# Patient Record
Sex: FEMALE | Race: WHITE | Employment: FULL TIME | URBAN - METROPOLITAN AREA
[De-identification: names, ages, dates, MRNs, and addresses within clinical notes are randomized per-mention and may not be internally consistent; named-entity substitution may affect disease eponyms.]

---

## 2017-01-11 ENCOUNTER — TELEPHONE (OUTPATIENT)
Dept: FAMILY MEDICINE CLINIC | Age: 52
End: 2017-01-11

## 2017-04-06 ENCOUNTER — HOSPITAL ENCOUNTER (OUTPATIENT)
Dept: OTHER | Age: 52
Discharge: HOME | End: 2017-04-06
Attending: OBSTETRICS & GYNECOLOGY | Admitting: OBSTETRICS & GYNECOLOGY

## 2017-04-09 LAB
HPV SOURCE: NORMAL
HPV, HIGH RISK OTHER: NEGATIVE

## 2017-04-12 ENCOUNTER — OFFICE VISIT (OUTPATIENT)
Dept: INTERNAL MEDICINE CLINIC | Age: 52
End: 2017-04-12

## 2017-04-12 VITALS
BODY MASS INDEX: 35.2 KG/M2 | OXYGEN SATURATION: 98 % | HEART RATE: 64 BPM | RESPIRATION RATE: 16 BRPM | DIASTOLIC BLOOD PRESSURE: 78 MMHG | HEIGHT: 66 IN | WEIGHT: 219 LBS | SYSTOLIC BLOOD PRESSURE: 136 MMHG

## 2017-04-12 DIAGNOSIS — Z00.00 PREVENTATIVE HEALTH CARE: ICD-10-CM

## 2017-04-12 DIAGNOSIS — E03.4 HYPOTHYROIDISM DUE TO ACQUIRED ATROPHY OF THYROID: ICD-10-CM

## 2017-04-12 DIAGNOSIS — F33.41 RECURRENT MAJOR DEPRESSIVE DISORDER, IN PARTIAL REMISSION (HCC): ICD-10-CM

## 2017-04-12 DIAGNOSIS — Z12.11 COLON CANCER SCREENING: ICD-10-CM

## 2017-04-12 DIAGNOSIS — I10 ESSENTIAL HYPERTENSION: Primary | ICD-10-CM

## 2017-04-12 DIAGNOSIS — J30.2 SEASONAL ALLERGIC RHINITIS, UNSPECIFIED ALLERGIC RHINITIS TRIGGER: ICD-10-CM

## 2017-04-12 PROCEDURE — 99215 OFFICE O/P EST HI 40 MIN: CPT | Performed by: INTERNAL MEDICINE

## 2017-04-12 RX ORDER — FLUTICASONE PROPIONATE 50 MCG
1 SPRAY, SUSPENSION (ML) NASAL DAILY
COMMUNITY
End: 2021-04-20

## 2017-04-12 RX ORDER — ESCITALOPRAM OXALATE 10 MG/1
10 TABLET ORAL DAILY
Qty: 90 TABLET | Refills: 3 | Status: SHIPPED | OUTPATIENT
Start: 2017-04-12 | End: 2018-01-05 | Stop reason: SDUPTHER

## 2017-04-12 RX ORDER — LORATADINE 10 MG/1
10 TABLET ORAL DAILY
COMMUNITY

## 2017-04-14 ENCOUNTER — HOSPITAL ENCOUNTER (OUTPATIENT)
Dept: OTHER | Age: 52
Discharge: HOME | End: 2017-04-14
Attending: OBSTETRICS & GYNECOLOGY | Admitting: OBSTETRICS & GYNECOLOGY

## 2017-04-14 ENCOUNTER — HOSPITAL ENCOUNTER (OUTPATIENT)
Dept: LAB | Age: 52
Discharge: HOME | End: 2017-04-14
Attending: FAMILY MEDICINE | Admitting: FAMILY MEDICINE

## 2017-04-14 LAB
ALBUMIN SERPL-MCNC: 4.4 GM/DL (ref 3.4–5)
ALP BLD-CCNC: 91 IU/L (ref 40–128)
ALT SERPL-CCNC: 34 U/L (ref 10–40)
ANION GAP SERPL CALCULATED.3IONS-SCNC: 13 MMOL/L (ref 4–16)
AST SERPL-CCNC: 21 IU/L (ref 15–37)
BASOPHILS ABSOLUTE: 0.1 K/CU MM
BASOPHILS RELATIVE PERCENT: 0.6 % (ref 0–1)
BILIRUB SERPL-MCNC: 0.5 MG/DL (ref 0–1)
BUN BLDV-MCNC: 16 MG/DL (ref 6–23)
CALCIUM SERPL-MCNC: 9.5 MG/DL (ref 8.3–10.6)
CHLORIDE BLD-SCNC: 101 MMOL/L (ref 99–110)
CHOLESTEROL: 200 MG/DL
CO2: 26 MMOL/L (ref 21–32)
CREAT SERPL-MCNC: 0.9 MG/DL (ref 0.6–1.1)
DIFFERENTIAL TYPE: ABNORMAL
EOSINOPHILS ABSOLUTE: 0.3 K/CU MM
EOSINOPHILS RELATIVE PERCENT: 4.1 % (ref 0–3)
GFR AFRICAN AMERICAN: >60 ML/MIN/1.73M2
GFR NON-AFRICAN AMERICAN: >60 ML/MIN/1.73M2
GLUCOSE BLD-MCNC: 96 MG/DL (ref 70–140)
HCT VFR BLD CALC: 44.9 % (ref 37–47)
HDLC SERPL-MCNC: 44 MG/DL
HEMOGLOBIN: 14.9 GM/DL (ref 12.5–16)
IMMATURE NEUTROPHIL %: 0.6 % (ref 0–0.43)
LDL CHOLESTEROL CALCULATED: 136 MG/DL
LYMPHOCYTES ABSOLUTE: 2 K/CU MM
LYMPHOCYTES RELATIVE PERCENT: 26.1 % (ref 24–44)
MCH RBC QN AUTO: 32.7 PG (ref 27–31)
MCHC RBC AUTO-ENTMCNC: 33.2 % (ref 32–36)
MCV RBC AUTO: 98.5 FL (ref 78–100)
MONOCYTES ABSOLUTE: 0.5 K/CU MM
MONOCYTES RELATIVE PERCENT: 6.3 % (ref 0–4)
NUCLEATED RBC %: 0 %
PDW BLD-RTO: 12.7 % (ref 11.7–14.9)
PLATELET # BLD: 309 K/CU MM (ref 140–440)
PMV BLD AUTO: 10.5 FL (ref 7.5–11.1)
POTASSIUM SERPL-SCNC: 4.5 MMOL/L (ref 3.5–5.1)
RBC # BLD: 4.56 M/CU MM (ref 4.2–5.4)
SEGMENTED NEUTROPHILS ABSOLUTE COUNT: 4.9 K/CU MM
SEGMENTED NEUTROPHILS RELATIVE PERCENT: 62.3 % (ref 36–66)
SODIUM BLD-SCNC: 140 MMOL/L (ref 135–145)
TOTAL IMMATURE NEUTOROPHIL: 0.05 K/CU MM
TOTAL NUCLEATED RBC: 0 K/CU MM
TOTAL PROTEIN: 6.3 GM/DL (ref 6.4–8.2)
TRIGL SERPL-MCNC: 101 MG/DL
WBC # BLD: 7.8 K/CU MM (ref 4–10.5)

## 2017-04-15 LAB — HIV SCREEN: NON REACTIVE

## 2017-04-17 DIAGNOSIS — E03.4 HYPOTHYROIDISM DUE TO ACQUIRED ATROPHY OF THYROID: ICD-10-CM

## 2017-04-17 RX ORDER — LEVOTHYROXINE SODIUM 175 UG/1
TABLET ORAL
Qty: 90 TABLET | Refills: 1 | Status: SHIPPED | OUTPATIENT
Start: 2017-04-17 | End: 2017-10-06 | Stop reason: SDUPTHER

## 2017-04-28 ENCOUNTER — HOSPITAL ENCOUNTER (OUTPATIENT)
Dept: GENERAL RADIOLOGY | Age: 52
Discharge: HOME | End: 2017-04-28
Attending: OBSTETRICS & GYNECOLOGY | Admitting: OBSTETRICS & GYNECOLOGY

## 2017-04-28 LAB — TSH HIGH SENSITIVITY: 0.43 UIU/ML (ref 0.27–4.2)

## 2017-05-22 ENCOUNTER — HOSPITAL ENCOUNTER (OUTPATIENT)
Dept: OTHER | Age: 52
Discharge: HOME | End: 2017-05-22
Attending: OBSTETRICS & GYNECOLOGY | Admitting: OBSTETRICS & GYNECOLOGY

## 2017-05-24 ENCOUNTER — TELEPHONE (OUTPATIENT)
Dept: FAMILY MEDICINE CLINIC | Age: 52
End: 2017-05-24

## 2017-05-24 ENCOUNTER — OFFICE VISIT (OUTPATIENT)
Dept: INTERNAL MEDICINE CLINIC | Age: 52
End: 2017-05-24

## 2017-05-24 VITALS
DIASTOLIC BLOOD PRESSURE: 78 MMHG | BODY MASS INDEX: 35.35 KG/M2 | RESPIRATION RATE: 16 BRPM | WEIGHT: 219 LBS | HEART RATE: 84 BPM | SYSTOLIC BLOOD PRESSURE: 124 MMHG

## 2017-05-24 DIAGNOSIS — F33.41 RECURRENT MAJOR DEPRESSIVE DISORDER, IN PARTIAL REMISSION (HCC): ICD-10-CM

## 2017-05-24 DIAGNOSIS — G47.10 HYPERSOMNIA: Primary | ICD-10-CM

## 2017-05-24 PROCEDURE — 99213 OFFICE O/P EST LOW 20 MIN: CPT | Performed by: INTERNAL MEDICINE

## 2017-05-24 ASSESSMENT — PATIENT HEALTH QUESTIONNAIRE - PHQ9
SUM OF ALL RESPONSES TO PHQ9 QUESTIONS 1 & 2: 0
1. LITTLE INTEREST OR PLEASURE IN DOING THINGS: 0
2. FEELING DOWN, DEPRESSED OR HOPELESS: 0
SUM OF ALL RESPONSES TO PHQ QUESTIONS 1-9: 0

## 2017-05-25 ENCOUNTER — HOSPITAL ENCOUNTER (OUTPATIENT)
Dept: WOMENS IMAGING | Age: 52
Discharge: HOME | End: 2017-05-25
Attending: OBSTETRICS & GYNECOLOGY | Admitting: OBSTETRICS & GYNECOLOGY

## 2017-05-25 DIAGNOSIS — Z12.31 VISIT FOR SCREENING MAMMOGRAM: ICD-10-CM

## 2017-05-31 ENCOUNTER — HOSPITAL ENCOUNTER (OUTPATIENT)
Dept: ULTRASOUND IMAGING | Age: 52
Discharge: HOME | End: 2017-05-31
Attending: OBSTETRICS & GYNECOLOGY | Admitting: OBSTETRICS & GYNECOLOGY

## 2017-05-31 DIAGNOSIS — R92.8 ABNORMAL MAMMOGRAM: ICD-10-CM

## 2017-06-13 RX ORDER — SODIUM CHLORIDE, SODIUM LACTATE, POTASSIUM CHLORIDE, CALCIUM CHLORIDE 600; 310; 30; 20 MG/100ML; MG/100ML; MG/100ML; MG/100ML
INJECTION, SOLUTION INTRAVENOUS CONTINUOUS
Status: DISCONTINUED | OUTPATIENT
Start: 2017-06-13 | End: 2017-06-15 | Stop reason: HOSPADM

## 2017-06-14 ENCOUNTER — HOSPITAL ENCOUNTER (OUTPATIENT)
Dept: SURGERY | Age: 52
Discharge: HOME | End: 2017-06-14
Attending: SPECIALIST | Admitting: SPECIALIST

## 2017-06-14 VITALS
TEMPERATURE: 97.8 F | DIASTOLIC BLOOD PRESSURE: 83 MMHG | OXYGEN SATURATION: 100 % | HEIGHT: 66 IN | HEART RATE: 56 BPM | RESPIRATION RATE: 16 BRPM | SYSTOLIC BLOOD PRESSURE: 127 MMHG | BODY MASS INDEX: 34.87 KG/M2 | WEIGHT: 217 LBS

## 2017-06-14 LAB — PREGNANCY TEST URINE, POC: NEGATIVE

## 2017-06-14 RX ADMIN — SODIUM CHLORIDE, SODIUM LACTATE, POTASSIUM CHLORIDE, CALCIUM CHLORIDE: 600; 310; 30; 20 INJECTION, SOLUTION INTRAVENOUS at 06:35

## 2017-06-14 ASSESSMENT — PAIN DESCRIPTION - DESCRIPTORS: DESCRIPTORS: CRAMPING

## 2017-06-14 ASSESSMENT — PAIN SCALES - GENERAL
PAINLEVEL_OUTOF10: 0
PAINLEVEL_OUTOF10: 1

## 2017-06-14 ASSESSMENT — PAIN DESCRIPTION - LOCATION: LOCATION: ABDOMEN

## 2017-06-14 ASSESSMENT — PAIN DESCRIPTION - PAIN TYPE: TYPE: ACUTE PAIN

## 2017-06-14 ASSESSMENT — PAIN - FUNCTIONAL ASSESSMENT: PAIN_FUNCTIONAL_ASSESSMENT: 0-10

## 2017-06-21 ENCOUNTER — HOSPITAL ENCOUNTER (OUTPATIENT)
Dept: SLEEP CENTER | Age: 52
Discharge: HOME | End: 2017-06-21
Attending: INTERNAL MEDICINE | Admitting: INTERNAL MEDICINE

## 2017-07-05 ENCOUNTER — HOSPITAL ENCOUNTER (OUTPATIENT)
Dept: SLEEP CENTER | Age: 52
Discharge: HOME | End: 2017-07-05
Attending: PSYCHIATRY & NEUROLOGY | Admitting: PSYCHIATRY & NEUROLOGY

## 2017-07-31 ENCOUNTER — OFFICE VISIT (OUTPATIENT)
Dept: INTERNAL MEDICINE CLINIC | Age: 52
End: 2017-07-31

## 2017-07-31 VITALS
RESPIRATION RATE: 16 BRPM | HEART RATE: 84 BPM | SYSTOLIC BLOOD PRESSURE: 122 MMHG | BODY MASS INDEX: 34.54 KG/M2 | DIASTOLIC BLOOD PRESSURE: 76 MMHG | WEIGHT: 214 LBS

## 2017-07-31 DIAGNOSIS — F33.41 RECURRENT MAJOR DEPRESSIVE DISORDER, IN PARTIAL REMISSION (HCC): Primary | ICD-10-CM

## 2017-07-31 PROCEDURE — 99213 OFFICE O/P EST LOW 20 MIN: CPT | Performed by: INTERNAL MEDICINE

## 2017-10-06 DIAGNOSIS — E03.4 HYPOTHYROIDISM DUE TO ACQUIRED ATROPHY OF THYROID: ICD-10-CM

## 2017-10-06 RX ORDER — LISINOPRIL 20 MG/1
TABLET ORAL
Qty: 90 TABLET | Refills: 3 | Status: SHIPPED | OUTPATIENT
Start: 2017-10-06 | End: 2018-01-05 | Stop reason: SDUPTHER

## 2017-10-06 RX ORDER — LEVOTHYROXINE SODIUM 175 MCG
TABLET ORAL
Qty: 90 TABLET | Refills: 0 | Status: SHIPPED | OUTPATIENT
Start: 2017-10-06 | End: 2018-01-05 | Stop reason: SDUPTHER

## 2017-12-05 ENCOUNTER — HOSPITAL ENCOUNTER (OUTPATIENT)
Dept: ULTRASOUND IMAGING | Age: 52
Discharge: HOME | End: 2017-12-05
Attending: OBSTETRICS & GYNECOLOGY | Admitting: OBSTETRICS & GYNECOLOGY

## 2017-12-05 DIAGNOSIS — R92.8 FOLLOW-UP EXAMINATION OF ABNORMAL MAMMOGRAM: ICD-10-CM

## 2017-12-05 DIAGNOSIS — Z09 FOLLOW-UP EXAM: ICD-10-CM

## 2018-01-05 DIAGNOSIS — E03.4 HYPOTHYROIDISM DUE TO ACQUIRED ATROPHY OF THYROID: ICD-10-CM

## 2018-01-05 DIAGNOSIS — F33.41 RECURRENT MAJOR DEPRESSIVE DISORDER, IN PARTIAL REMISSION (HCC): ICD-10-CM

## 2018-01-05 RX ORDER — ESCITALOPRAM OXALATE 10 MG/1
10 TABLET ORAL DAILY
Qty: 90 TABLET | Refills: 0 | Status: SHIPPED | OUTPATIENT
Start: 2018-01-05 | End: 2018-04-16 | Stop reason: SDUPTHER

## 2018-01-05 RX ORDER — LEVOTHYROXINE SODIUM 175 UG/1
TABLET ORAL
Qty: 90 TABLET | Refills: 0 | Status: SHIPPED | OUTPATIENT
Start: 2018-01-05 | End: 2018-04-16 | Stop reason: SDUPTHER

## 2018-01-05 RX ORDER — LISINOPRIL 20 MG/1
TABLET ORAL
Qty: 90 TABLET | Refills: 0 | Status: SHIPPED | OUTPATIENT
Start: 2018-01-05 | End: 2018-04-16 | Stop reason: SDUPTHER

## 2018-02-13 ENCOUNTER — HOSPITAL ENCOUNTER (OUTPATIENT)
Dept: OTHER | Age: 53
Discharge: HOME | End: 2018-02-13
Attending: OBSTETRICS & GYNECOLOGY | Admitting: OBSTETRICS & GYNECOLOGY

## 2018-04-16 ENCOUNTER — OFFICE VISIT (OUTPATIENT)
Dept: INTERNAL MEDICINE CLINIC | Age: 53
End: 2018-04-16

## 2018-04-16 VITALS
RESPIRATION RATE: 18 BRPM | WEIGHT: 211.4 LBS | OXYGEN SATURATION: 98 % | HEART RATE: 70 BPM | DIASTOLIC BLOOD PRESSURE: 80 MMHG | BODY MASS INDEX: 34.12 KG/M2 | SYSTOLIC BLOOD PRESSURE: 122 MMHG

## 2018-04-16 DIAGNOSIS — E03.4 HYPOTHYROIDISM DUE TO ACQUIRED ATROPHY OF THYROID: ICD-10-CM

## 2018-04-16 DIAGNOSIS — I10 ESSENTIAL HYPERTENSION: ICD-10-CM

## 2018-04-16 DIAGNOSIS — Z00.00 PREVENTATIVE HEALTH CARE: Primary | ICD-10-CM

## 2018-04-16 DIAGNOSIS — F33.41 RECURRENT MAJOR DEPRESSIVE DISORDER, IN PARTIAL REMISSION (HCC): ICD-10-CM

## 2018-04-16 PROCEDURE — 99396 PREV VISIT EST AGE 40-64: CPT | Performed by: INTERNAL MEDICINE

## 2018-04-16 RX ORDER — LISINOPRIL 20 MG/1
TABLET ORAL
Qty: 90 TABLET | Refills: 3 | Status: SHIPPED | OUTPATIENT
Start: 2018-04-16 | End: 2019-02-12 | Stop reason: SDUPTHER

## 2018-04-16 RX ORDER — LEVOTHYROXINE SODIUM 175 UG/1
TABLET ORAL
Qty: 90 TABLET | Refills: 3 | Status: SHIPPED | OUTPATIENT
Start: 2018-04-16 | End: 2019-02-12 | Stop reason: SDUPTHER

## 2018-04-16 RX ORDER — ESCITALOPRAM OXALATE 10 MG/1
10 TABLET ORAL DAILY
Qty: 30 TABLET | Refills: 0 | Status: SHIPPED | OUTPATIENT
Start: 2018-04-16 | End: 2019-02-12 | Stop reason: SDUPTHER

## 2018-04-16 RX ORDER — ESCITALOPRAM OXALATE 10 MG/1
10 TABLET ORAL DAILY
Qty: 90 TABLET | Refills: 3 | Status: SHIPPED | OUTPATIENT
Start: 2018-04-16 | End: 2018-04-16 | Stop reason: SDUPTHER

## 2018-05-29 ENCOUNTER — OFFICE VISIT (OUTPATIENT)
Dept: INTERNAL MEDICINE CLINIC | Age: 53
End: 2018-05-29

## 2018-05-29 VITALS
DIASTOLIC BLOOD PRESSURE: 76 MMHG | WEIGHT: 209 LBS | SYSTOLIC BLOOD PRESSURE: 132 MMHG | OXYGEN SATURATION: 98 % | BODY MASS INDEX: 33.73 KG/M2 | RESPIRATION RATE: 16 BRPM | HEART RATE: 62 BPM

## 2018-05-29 DIAGNOSIS — R53.83 FATIGUE, UNSPECIFIED TYPE: Primary | ICD-10-CM

## 2018-05-29 PROCEDURE — 99213 OFFICE O/P EST LOW 20 MIN: CPT | Performed by: INTERNAL MEDICINE

## 2018-05-29 PROCEDURE — G0444 DEPRESSION SCREEN ANNUAL: HCPCS | Performed by: INTERNAL MEDICINE

## 2018-05-29 ASSESSMENT — PATIENT HEALTH QUESTIONNAIRE - PHQ9
SUM OF ALL RESPONSES TO PHQ9 QUESTIONS 1 & 2: 5
9. THOUGHTS THAT YOU WOULD BE BETTER OFF DEAD, OR OF HURTING YOURSELF: 0
4. FEELING TIRED OR HAVING LITTLE ENERGY: 3
7. TROUBLE CONCENTRATING ON THINGS, SUCH AS READING THE NEWSPAPER OR WATCHING TELEVISION: 3
1. LITTLE INTEREST OR PLEASURE IN DOING THINGS: 3
10. IF YOU CHECKED OFF ANY PROBLEMS, HOW DIFFICULT HAVE THESE PROBLEMS MADE IT FOR YOU TO DO YOUR WORK, TAKE CARE OF THINGS AT HOME, OR GET ALONG WITH OTHER PEOPLE: 2
3. TROUBLE FALLING OR STAYING ASLEEP: 3
SUM OF ALL RESPONSES TO PHQ QUESTIONS 1-9: 16
2. FEELING DOWN, DEPRESSED OR HOPELESS: 2
6. FEELING BAD ABOUT YOURSELF - OR THAT YOU ARE A FAILURE OR HAVE LET YOURSELF OR YOUR FAMILY DOWN: 0
8. MOVING OR SPEAKING SO SLOWLY THAT OTHER PEOPLE COULD HAVE NOTICED. OR THE OPPOSITE, BEING SO FIGETY OR RESTLESS THAT YOU HAVE BEEN MOVING AROUND A LOT MORE THAN USUAL: 2
5. POOR APPETITE OR OVEREATING: 0

## 2018-09-28 ENCOUNTER — OFFICE VISIT (OUTPATIENT)
Dept: INTERNAL MEDICINE CLINIC | Age: 53
End: 2018-09-28
Payer: COMMERCIAL

## 2018-09-28 VITALS
BODY MASS INDEX: 34.44 KG/M2 | WEIGHT: 213.4 LBS | SYSTOLIC BLOOD PRESSURE: 132 MMHG | DIASTOLIC BLOOD PRESSURE: 84 MMHG | HEART RATE: 57 BPM | OXYGEN SATURATION: 98 %

## 2018-09-28 DIAGNOSIS — R42 VERTIGO: ICD-10-CM

## 2018-09-28 DIAGNOSIS — R30.0 DYSURIA: Primary | ICD-10-CM

## 2018-09-28 LAB
BILIRUBIN URINE: NEGATIVE
BILIRUBIN, POC: NORMAL
BLOOD URINE, POC: NORMAL
BLOOD, URINE: NEGATIVE
CLARITY, POC: CLEAR
CLARITY: CLEAR
COLOR, POC: NORMAL
COLOR: YELLOW
GLUCOSE URINE, POC: NORMAL
GLUCOSE URINE: NEGATIVE MG/DL
KETONES, POC: NORMAL
KETONES, URINE: NEGATIVE MG/DL
LEUKOCYTE EST, POC: NORMAL
LEUKOCYTE ESTERASE, URINE: NEGATIVE
MICROSCOPIC EXAMINATION: NORMAL
NITRITE, POC: NORMAL
NITRITE, URINE: NEGATIVE
PH UA: 6.5
PH, POC: 6
PROTEIN UA: NEGATIVE MG/DL
PROTEIN, POC: NORMAL
SPECIFIC GRAVITY UA: 1.01
SPECIFIC GRAVITY, POC: <=1.005
URINE TYPE: NORMAL
UROBILINOGEN, POC: 0.2
UROBILINOGEN, URINE: 0.2 E.U./DL

## 2018-09-28 PROCEDURE — 99213 OFFICE O/P EST LOW 20 MIN: CPT | Performed by: INTERNAL MEDICINE

## 2018-09-28 PROCEDURE — 81002 URINALYSIS NONAUTO W/O SCOPE: CPT | Performed by: INTERNAL MEDICINE

## 2018-09-28 NOTE — PROGRESS NOTES
[facility]  1965  09/28/18    SUBJECTIVE:    Last Wednesday pt developed vertigo. She rolled over in bed and immediately had vertigo. Each episode would last 15-30 seconds, accompanied by nausea without vomiting. She denies any numbness, weakness. Since then she has had dizziness with movement. She did have a headache yesterday. She has mild tingling with urination, denies hematuria, urgency, frequency, suprapubic pain. She has a h/o uti that caused similar neuro symptoms. Yesterday she did have sneezing, rhinorrhea, nasal congestion. Mood has been better with starting HRT. OBJECTIVE:    /84 (Site: Left Upper Arm, Position: Sitting, Cuff Size: Medium Adult)   Pulse 57   Wt 213 lb 6.4 oz (96.8 kg)   SpO2 98%   BMI 34.44 kg/m²     Physical Exam   Constitutional: She is oriented to person, place, and time. She appears well-developed and well-nourished. Eyes: Conjunctivae are normal. No scleral icterus. Neck: Neck supple. No tracheal deviation present. No thyromegaly present. Cardiovascular: Normal rate, regular rhythm and intact distal pulses. Exam reveals no gallop and no friction rub. No murmur heard. Pulmonary/Chest: No respiratory distress. She has no wheezes. She has no rales. Abdominal: Soft. Bowel sounds are normal. She exhibits no distension and no mass. There is no hepatomegaly. There is no tenderness. There is no rebound and no guarding. Lymphadenopathy:     She has no cervical adenopathy. Neurological: She is alert and oriented to person, place, and time. She has normal strength. No cranial nerve deficit or sensory deficit. (-) suzan hallpike maneuver   Skin: No cyanosis. Nails show no clubbing. Psychiatric: She has a normal mood and affect. Her behavior is normal. Judgment normal.       ASSESSMENT:    1. Dysuria    2. Vertigo    3. Positive depression screening        PLAN:    Leandro Baron was seen today for dizziness.     Diagnoses and all orders for this visit:    Dysuria - will check urine studies    Vertigo - most suspicious for BPPV, but symptoms improved. Allergies may be contributing to current symptoms.   -     Urine Culture  -     URINALYSIS    Positive depression screening  -     Positive Screen for Clinical Depression with a Documented Follow-up Plan     Other orders  -     POCT Urinalysis no Micro          On the basis of positive PHQ-9 screening ( ), the following plan was implemented: none- doing well with current regimen. Patient will follow-up in 6 month(s) with PCP.

## 2018-09-30 LAB — URINE CULTURE, ROUTINE: NORMAL

## 2019-01-14 ENCOUNTER — OFFICE VISIT (OUTPATIENT)
Dept: INTERNAL MEDICINE CLINIC | Age: 54
End: 2019-01-14
Payer: COMMERCIAL

## 2019-01-14 VITALS
WEIGHT: 220 LBS | SYSTOLIC BLOOD PRESSURE: 124 MMHG | HEART RATE: 68 BPM | BODY MASS INDEX: 35.51 KG/M2 | RESPIRATION RATE: 18 BRPM | DIASTOLIC BLOOD PRESSURE: 70 MMHG

## 2019-01-14 DIAGNOSIS — M79.645 PAIN OF LEFT THUMB: Primary | ICD-10-CM

## 2019-01-14 PROCEDURE — 99213 OFFICE O/P EST LOW 20 MIN: CPT | Performed by: INTERNAL MEDICINE

## 2019-02-12 DIAGNOSIS — F33.41 RECURRENT MAJOR DEPRESSIVE DISORDER, IN PARTIAL REMISSION (HCC): ICD-10-CM

## 2019-02-12 DIAGNOSIS — I10 ESSENTIAL HYPERTENSION: ICD-10-CM

## 2019-02-12 DIAGNOSIS — E03.4 HYPOTHYROIDISM DUE TO ACQUIRED ATROPHY OF THYROID: ICD-10-CM

## 2019-02-12 RX ORDER — ESCITALOPRAM OXALATE 10 MG/1
TABLET ORAL
Qty: 90 TABLET | Refills: 3 | Status: SHIPPED | OUTPATIENT
Start: 2019-02-12 | End: 2019-04-17

## 2019-02-12 RX ORDER — LEVOTHYROXINE SODIUM 175 UG/1
TABLET ORAL
Qty: 90 TABLET | Refills: 3 | Status: SHIPPED | OUTPATIENT
Start: 2019-02-12 | End: 2020-01-30 | Stop reason: SDUPTHER

## 2019-02-12 RX ORDER — LISINOPRIL 20 MG/1
TABLET ORAL
Qty: 90 TABLET | Refills: 3 | Status: SHIPPED | OUTPATIENT
Start: 2019-02-12 | End: 2020-01-30 | Stop reason: SDUPTHER

## 2019-04-17 ENCOUNTER — OFFICE VISIT (OUTPATIENT)
Dept: INTERNAL MEDICINE CLINIC | Age: 54
End: 2019-04-17
Payer: COMMERCIAL

## 2019-04-17 VITALS
SYSTOLIC BLOOD PRESSURE: 120 MMHG | BODY MASS INDEX: 35.09 KG/M2 | RESPIRATION RATE: 18 BRPM | OXYGEN SATURATION: 98 % | DIASTOLIC BLOOD PRESSURE: 72 MMHG | WEIGHT: 217.4 LBS | HEART RATE: 61 BPM

## 2019-04-17 DIAGNOSIS — F33.41 RECURRENT MAJOR DEPRESSIVE DISORDER, IN PARTIAL REMISSION (HCC): ICD-10-CM

## 2019-04-17 DIAGNOSIS — I10 ESSENTIAL HYPERTENSION: ICD-10-CM

## 2019-04-17 DIAGNOSIS — E03.4 HYPOTHYROIDISM DUE TO ACQUIRED ATROPHY OF THYROID: ICD-10-CM

## 2019-04-17 DIAGNOSIS — Z00.00 ENCOUNTER FOR PREVENTIVE CARE: Primary | ICD-10-CM

## 2019-04-17 PROCEDURE — 99396 PREV VISIT EST AGE 40-64: CPT | Performed by: INTERNAL MEDICINE

## 2019-04-17 PROCEDURE — 90715 TDAP VACCINE 7 YRS/> IM: CPT | Performed by: INTERNAL MEDICINE

## 2019-04-17 PROCEDURE — 90471 IMMUNIZATION ADMIN: CPT | Performed by: INTERNAL MEDICINE

## 2019-04-17 RX ORDER — ESCITALOPRAM OXALATE 20 MG/1
20 TABLET ORAL DAILY
Qty: 90 TABLET | Refills: 3 | Status: SHIPPED | OUTPATIENT
Start: 2019-04-17 | End: 2020-03-07 | Stop reason: SDUPTHER

## 2019-04-17 NOTE — PROGRESS NOTES
Lyndsey Cosme  1965  04/17/19    SUBJECTIVE:    Pt continues on synthroid for hypothyroidism     Pt will see Dr Petty Lyons - she will need surgery for a bone spur and tendonosis on the right foot. The patient is taking hypertensive medications compliantly without side effects. Denies chest pain, dyspnea, edema, or TIA's. Blood pressure has been 130/70. Mood is doing OK on lexapro - she is intersted in trying a higher dose. OBJECTIVE:    /72   Pulse 61   Resp 18   Wt 217 lb 6.4 oz (98.6 kg)   SpO2 98%   BMI 35.09 kg/m²     Physical Exam   Constitutional: She is oriented to person, place, and time. She appears well-developed and well-nourished. Eyes: Conjunctivae are normal. No scleral icterus. Neck: Neck supple. No tracheal deviation present. No thyromegaly present. Cardiovascular: Normal rate, regular rhythm and intact distal pulses. Exam reveals no gallop and no friction rub. No murmur heard. Pulmonary/Chest: No respiratory distress. She has no wheezes. She has no rales. Abdominal: Soft. Bowel sounds are normal. She exhibits no distension and no mass. There is no hepatomegaly. There is no tenderness. There is no rebound and no guarding. Lymphadenopathy:     She has no cervical adenopathy. Neurological: She is alert and oriented to person, place, and time. Skin: No cyanosis. Nails show no clubbing. Psychiatric: She has a normal mood and affect. Her behavior is normal. Judgment normal.       ASSESSMENT:    1. Encounter for preventive care    2. Recurrent major depressive disorder, in partial remission ([facility] [address]    3. Hypothyroidism due to acquired atrophy of thyroid    4. Essential hypertension        PLAN:    Felicia Donald was seen today for other. Diagnoses and all orders for this visit:    Encounter for preventive care - up to date on GYN care, c-scope; tdap today; check labs; encourage exercise; BP at goal  -     Comprehensive Metabolic Panel;  Future  -     Lipid Panel; Future  -     TSH without Reflex; Future  -     CBC Auto Differential; Future  -     Tdap (age 6y and older) IM (BOOSTRIX)    Recurrent major depressive disorder, in partial remission ([facility] [address] - will try higher dose of lexapro  -     escitalopram (LEXAPRO) 20 MG tablet; Take 1 tablet by mouth daily    Hypothyroidism due to acquired atrophy of thyroid - cont synthroid; check labs  -     TSH without Reflex; Future    Essential hypertension - at goal on lisinopril; no change  -     Comprehensive Metabolic Panel; Future  -     Lipid Panel; Future    Other orders  -     estrogens, conjugated, (PREMARIN) 0.625 MG tablet;  Take 1 tablet by mouth daily

## 2019-07-09 ENCOUNTER — OFFICE VISIT (OUTPATIENT)
Dept: INTERNAL MEDICINE CLINIC | Age: 54
End: 2019-07-09
Payer: COMMERCIAL

## 2019-07-09 VITALS
HEART RATE: 56 BPM | OXYGEN SATURATION: 98 % | SYSTOLIC BLOOD PRESSURE: 130 MMHG | BODY MASS INDEX: 35.25 KG/M2 | RESPIRATION RATE: 16 BRPM | DIASTOLIC BLOOD PRESSURE: 78 MMHG | WEIGHT: 218.4 LBS

## 2019-07-09 DIAGNOSIS — R94.31 EKG ABNORMALITY: ICD-10-CM

## 2019-07-09 DIAGNOSIS — R00.2 PALPITATION: Primary | ICD-10-CM

## 2019-07-09 PROCEDURE — 99214 OFFICE O/P EST MOD 30 MIN: CPT | Performed by: INTERNAL MEDICINE

## 2019-07-09 PROCEDURE — 93000 ELECTROCARDIOGRAM COMPLETE: CPT | Performed by: INTERNAL MEDICINE

## 2019-07-09 NOTE — PROGRESS NOTES
because of recent foot surgery, so pharmacologic stress test ordered  -     EKG 12 lead; Future  -     EKG 12 lead  -     Holter Monitor 48 Hour; Future    EKG abnormality  -     Stress Test W Pharm;  Future

## 2019-07-11 ENCOUNTER — HOSPITAL ENCOUNTER (OUTPATIENT)
Dept: NUCLEAR MEDICINE | Age: 54
Discharge: HOME | End: 2019-07-11
Payer: COMMERCIAL

## 2019-07-11 ENCOUNTER — HOSPITAL ENCOUNTER (OUTPATIENT)
Dept: NON INVASIVE DIAGNOSTICS | Age: 54
Discharge: HOME | End: 2019-07-11
Payer: COMMERCIAL

## 2019-07-11 DIAGNOSIS — R00.2 PALPITATION: ICD-10-CM

## 2019-07-11 DIAGNOSIS — R94.31 EKG ABNORMALITY: ICD-10-CM

## 2019-07-11 LAB
LV EF: 60 %
LVEF MODALITY: NORMAL

## 2019-07-11 PROCEDURE — 93225 XTRNL ECG REC<48 HRS REC: CPT

## 2019-07-11 PROCEDURE — A9500 TC99M SESTAMIBI: HCPCS | Performed by: INTERNAL MEDICINE

## 2019-07-11 PROCEDURE — 93226 XTRNL ECG REC<48 HR SCAN A/R: CPT

## 2019-07-11 PROCEDURE — 3430000000 HC RX DIAGNOSTIC RADIOPHARMACEUTICAL: Performed by: INTERNAL MEDICINE

## 2019-07-11 PROCEDURE — 78452 HT MUSCLE IMAGE SPECT MULT: CPT

## 2019-07-11 PROCEDURE — 93017 CV STRESS TEST TRACING ONLY: CPT

## 2019-07-11 PROCEDURE — 6360000002 HC RX W HCPCS: Performed by: INTERNAL MEDICINE

## 2019-07-11 RX ADMIN — REGADENOSON 0.4 MG: 0.08 INJECTION, SOLUTION INTRAVENOUS at 10:42

## 2019-07-11 RX ADMIN — Medication 10 MILLICURIE: at 09:05

## 2019-07-11 RX ADMIN — Medication 30 MILLICURIE: at 10:50

## 2019-07-18 LAB
ACQUISITION DURATION: NORMAL S
AVERAGE HEART RATE: 66 BPM
EKG DIAGNOSIS: NORMAL
FASTEST SUPRAVENTRICULAR RATE: 142 BPM
HOOKUP DATE: NORMAL
HOOKUP TIME: NORMAL
LONGEST SUPRAVENTRICULAR RATE: 80 BPM
MAX HEART RATE TIME/DATE: NORMAL
MAX HEART RATE: 112 BPM
MIN HEART RATE TIME/DATE: NORMAL
MIN HEART RATE: 44 BPM
NUMBER OF FASTEST SUPRAVENTRICULAR BEATS: 3
NUMBER OF LONGEST SUPRAVENTRICULAR BEATS: 4
NUMBER OF QRS COMPLEXES: NORMAL
NUMBER OF SUPRAVENTRICULAR BEATS IN RUNS: 10
NUMBER OF SUPRAVENTRICULAR COUPLETS: 18
NUMBER OF SUPRAVENTRICULAR ECTOPICS: 2094
NUMBER OF SUPRAVENTRICULAR ISOLATED BEATS: 2046
NUMBER OF SUPRAVENTRICULAR RUNS: 3
NUMBER OF VENTRICULAR BEATS IN RUNS: 0
NUMBER OF VENTRICULAR BIGEMINAL CYCLES: 0
NUMBER OF VENTRICULAR COUPLETS: 0
NUMBER OF VENTRICULAR ECTOPICS: 23
NUMBER OF VENTRICULAR ISOLATED BEATS: 23
NUMBER OF VENTRICULAR RUNS: 0

## 2019-07-25 ENCOUNTER — TELEPHONE (OUTPATIENT)
Dept: INTERNAL MEDICINE CLINIC | Age: 54
End: 2019-07-25

## 2020-01-30 ENCOUNTER — OFFICE VISIT (OUTPATIENT)
Dept: INTERNAL MEDICINE CLINIC | Age: 55
End: 2020-01-30
Payer: COMMERCIAL

## 2020-01-30 VITALS
WEIGHT: 218 LBS | DIASTOLIC BLOOD PRESSURE: 80 MMHG | OXYGEN SATURATION: 95 % | HEART RATE: 65 BPM | RESPIRATION RATE: 18 BRPM | SYSTOLIC BLOOD PRESSURE: 128 MMHG | BODY MASS INDEX: 35.19 KG/M2

## 2020-01-30 PROCEDURE — 99213 OFFICE O/P EST LOW 20 MIN: CPT | Performed by: INTERNAL MEDICINE

## 2020-01-30 RX ORDER — PREDNISONE 10 MG/1
TABLET ORAL
Qty: 20 TABLET | Refills: 0 | Status: SHIPPED | OUTPATIENT
Start: 2020-01-30 | End: 2020-04-20 | Stop reason: ALTCHOICE

## 2020-01-30 RX ORDER — LEVOTHYROXINE SODIUM 175 UG/1
TABLET ORAL
Qty: 90 TABLET | Refills: 3 | Status: SHIPPED | OUTPATIENT
Start: 2020-01-30 | End: 2020-03-07 | Stop reason: SDUPTHER

## 2020-01-30 RX ORDER — LISINOPRIL 20 MG/1
TABLET ORAL
Qty: 90 TABLET | Refills: 3 | Status: SHIPPED | OUTPATIENT
Start: 2020-01-30 | End: 2020-03-07 | Stop reason: SDUPTHER

## 2020-01-30 NOTE — PROGRESS NOTES
Federica Scott  1965  01/30/20    SUBJECTIVE:    On 1/1 pt developed cough, fevers to 103, chills, mild rhinorrhea and nasal congestion, myalgias, wheezing. She denies headache, ear pain, sinus pain. Symptoms have improved in general. However, she now has hoarseness, SOB, pre-syncope, mild rhinorrhea, minimal cough. She no longer has fevers, wheezing. OBJECTIVE:    /80   Pulse 65   Resp 18   Wt 218 lb (98.9 kg)   SpO2 95%   BMI 35.19 kg/m²     Physical Exam  Constitutional:       Appearance: She is well-developed. HENT:      Right Ear: External ear normal.      Left Ear: External ear normal.      Nose: Nose normal.      Mouth/Throat:      Pharynx: No oropharyngeal exudate. Eyes:      Conjunctiva/sclera: Conjunctivae normal.   Cardiovascular:      Rate and Rhythm: Normal rate and regular rhythm. Heart sounds: Normal heart sounds. Pulmonary:      Effort: Pulmonary effort is normal.      Breath sounds: Normal breath sounds. Lymphadenopathy:      Cervical: No cervical adenopathy. Neurological:      Mental Status: She is alert. ASSESSMENT:    1. SOB (shortness of breath)    2. Hypothyroidism due to acquired atrophy of thyroid    3. Essential hypertension        PLAN:    Shirley Colmenares was seen today for shortness of breath. Diagnoses and all orders for this visit:    SOB (shortness of breath) - likely did have the flu now with residual inflammation. TX with prednsone taper    Hypothyroidism due to acquired atrophy of thyroid  -     levothyroxine (SYNTHROID) 175 MCG tablet; TAKE 1 TABLET DAILY    Essential hypertension  -     lisinopril (PRINIVIL;ZESTRIL) 20 MG tablet; TAKE 1 TABLET DAILY    Other orders  -     predniSONE (DELTASONE) 10 MG tablet;  Take 4 tablets daily for 2 days, then 3 tablets daily for 2 days, then two tablets daily for 2 days, then one tablet daily for 2 days

## 2020-03-09 RX ORDER — LISINOPRIL 20 MG/1
TABLET ORAL
Qty: 90 TABLET | Refills: 3 | Status: SHIPPED | OUTPATIENT
Start: 2020-03-09 | End: 2021-03-01

## 2020-03-09 RX ORDER — LEVOTHYROXINE SODIUM 175 UG/1
TABLET ORAL
Qty: 90 TABLET | Refills: 3 | Status: SHIPPED | OUTPATIENT
Start: 2020-03-09 | End: 2021-03-01

## 2020-03-09 RX ORDER — ESCITALOPRAM OXALATE 20 MG/1
20 TABLET ORAL DAILY
Qty: 90 TABLET | Refills: 3 | Status: SHIPPED | OUTPATIENT
Start: 2020-03-09 | End: 2020-04-20

## 2020-04-20 ENCOUNTER — TELEMEDICINE (OUTPATIENT)
Dept: INTERNAL MEDICINE CLINIC | Age: 55
End: 2020-04-20
Payer: COMMERCIAL

## 2020-04-20 PROCEDURE — 99396 PREV VISIT EST AGE 40-64: CPT | Performed by: INTERNAL MEDICINE

## 2020-04-20 RX ORDER — ESCITALOPRAM OXALATE 10 MG/1
10 TABLET ORAL DAILY
Qty: 90 TABLET | Refills: 3 | Status: SHIPPED | OUTPATIENT
Start: 2020-04-20 | End: 2020-05-27

## 2020-05-27 RX ORDER — ESCITALOPRAM OXALATE 20 MG/1
20 TABLET ORAL DAILY
Qty: 90 TABLET | Refills: 3
Start: 2020-05-27 | End: 2021-04-19

## 2020-11-17 ENCOUNTER — VIRTUAL VISIT (OUTPATIENT)
Dept: INTERNAL MEDICINE CLINIC | Age: 55
End: 2020-11-17
Payer: COMMERCIAL

## 2020-11-17 PROCEDURE — 99213 OFFICE O/P EST LOW 20 MIN: CPT | Performed by: INTERNAL MEDICINE

## 2020-11-17 RX ORDER — AMOXICILLIN 500 MG/1
500 CAPSULE ORAL 3 TIMES DAILY
Qty: 30 CAPSULE | Refills: 0 | Status: SHIPPED | OUTPATIENT
Start: 2020-11-17 | End: 2020-11-27

## 2020-11-17 NOTE — PROGRESS NOTES
2020    TELEHEALTH EVALUATION -- Audio/Visual (During [place]-69 public health emergency)    HPI:    Umm Rater (:  1965) has requested an audio/video evaluation for the following concern(s):    Friday pt developed PND, then yesterday developed sinus pressure, itching ears, watering eyes, mid dry cough, bilat ear pain, mild SOB and wheezing. She denies sore throat, F/C, myalgias, HA, N/V, Diarrhea, loss of taste or smell. She has used warm compresses, tylenol, fluids, loratidine, saline rinse. Review of Systems    Prior to Visit Medications    Medication Sig Taking? Authorizing Provider   amoxicillin (AMOXIL) 500 MG capsule Take 1 capsule by mouth 3 times daily for 10 days Yes Janette Holt MD   escitalopram (LEXAPRO) 20 MG tablet Take 1 tablet by mouth daily Yes Janette Holt MD   levothyroxine (SYNTHROID) 175 MCG tablet TAKE 1 TABLET DAILY Yes Janette Holt MD   lisinopril (PRINIVIL;ZESTRIL) 20 MG tablet TAKE 1 TABLET DAILY Yes Janette Holt MD   estrogens, conjugated, (PREMARIN) 0.625 MG tablet Take 1 tablet by mouth daily Yes Janette Holt MD   fluocinonide (LIDEX) 0.05 % cream Apply topically 2 times daily. Yes Janette Holt MD   diclofenac sodium 1 % GEL Apply 2 g topically 4 times daily Apply to affect heal on area on the right heal four times daily Yes Jenn Gomez MD   loratadine (CLARITIN) 10 MG tablet Take 10 mg by mouth daily Yes Historical Provider, MD   multivitamin [facility]) per tablet Take 1 tablet by mouth daily.    Yes Historical Provider, MD   UNABLE TO FIND Please administer two SHINGRIX vaccines 2-6 months apart  Patient not taking: Reported on 2020  Janette Holt MD   fluticasone [facility]) 50 MCG/ACT nasal spray 1 spray by Nasal route daily  Historical Provider, MD       Social History     Tobacco Use    Smoking status: Never Smoker    Smokeless tobacco: Never Used   Substance Use Topics  Alcohol use: No     Comment: \"quit drinking 8 yrs ago- use to drink 3-4 times per week- average 3 beers\"    Drug use: No        PHYSICAL EXAMINATION:    Constitutional: [x] Appears well-developed and well-nourished [x] No apparent distress      [] Abnormal-   Mental status  [x] Alert and awake  [x] Oriented to person/place/time [x]Able to follow commands             Psychiatric:       [x] Normal Affect [x] No Hallucinations    ASSESSMENT/PLAN:  1. Acute recurrent maxillary sinusitis - likely sinusitis, but will need to check for covid. Start amox  - amoxicillin (AMOXIL) 500 MG capsule; Take 1 capsule by mouth 3 times daily for 10 days  Dispense: 30 capsule; Refill: 0    2. Encounter for preventive care  - COVID-19 Ambulatory; Future      No follow-ups on file. Xenia Villalta is a 49 y.o. female being evaluated by a Virtual Visit (video visit) encounter to address concerns as mentioned above. A caregiver was present when appropriate. Due to this being a TeleHealth encounter (During WUTAO-30 public health emergency), evaluation of the following organ systems was limited: Vitals/Constitutional/EENT/Resp/CV/GI//MS/Neuro/Skin/Heme-Lymph-Imm. Pursuant to the emergency declaration under the [address] [place] [address] and the [employer] General Act, this Virtual Visit was conducted with patient's (and/or legal guardian's) consent, to reduce the patient's risk of exposure to COVID-19 and provide necessary medical care. The patient (and/or legal guardian) has also been advised to contact this office for worsening conditions or problems, and seek emergency medical treatment and/or call 911 if deemed necessary.      Patient identification was verified at the start of the visit: Yes    Total time spent on this encounter: Not billed by time    Services were provided through a video synchronous discussion virtually to substitute for in-person clinic visit. Patient and provider were located at their individual homes. --Joseph Sesay MD on 11/17/2020 at 4:25 PM    An electronic signature was used to authenticate this note.

## 2020-11-20 ENCOUNTER — HOSPITAL ENCOUNTER (OUTPATIENT)
Age: 55
Setting detail: SPECIMEN
Discharge: HOME | End: 2020-11-20
Payer: COMMERCIAL

## 2020-11-20 PROCEDURE — U0002 COVID-19 LAB TEST NON-CDC: HCPCS

## 2020-11-21 LAB
SARS-COV-2: DETECTED
SOURCE: ABNORMAL

## 2021-02-28 DIAGNOSIS — I10 ESSENTIAL HYPERTENSION: ICD-10-CM

## 2021-02-28 DIAGNOSIS — E03.4 HYPOTHYROIDISM DUE TO ACQUIRED ATROPHY OF THYROID: ICD-10-CM

## 2021-03-01 RX ORDER — LISINOPRIL 20 MG/1
TABLET ORAL
Qty: 90 TABLET | Refills: 3 | Status: SHIPPED | OUTPATIENT
Start: 2021-03-01 | End: 2022-02-16

## 2021-03-01 RX ORDER — LEVOTHYROXINE SODIUM 175 UG/1
TABLET ORAL
Qty: 90 TABLET | Refills: 3 | Status: SHIPPED | OUTPATIENT
Start: 2021-03-01 | End: 2022-02-16

## 2021-04-17 DIAGNOSIS — F33.41 RECURRENT MAJOR DEPRESSIVE DISORDER, IN PARTIAL REMISSION (HCC): ICD-10-CM

## 2021-04-19 RX ORDER — ESCITALOPRAM OXALATE 20 MG/1
TABLET ORAL
Qty: 90 TABLET | Refills: 3 | Status: SHIPPED | OUTPATIENT
Start: 2021-04-19 | End: 2022-04-05

## 2021-04-20 ENCOUNTER — OFFICE VISIT (OUTPATIENT)
Dept: INTERNAL MEDICINE CLINIC | Age: 56
End: 2021-04-20
Payer: COMMERCIAL

## 2021-04-20 VITALS
WEIGHT: 223 LBS | SYSTOLIC BLOOD PRESSURE: 120 MMHG | RESPIRATION RATE: 18 BRPM | DIASTOLIC BLOOD PRESSURE: 80 MMHG | BODY MASS INDEX: 35.99 KG/M2 | HEART RATE: 65 BPM | OXYGEN SATURATION: 98 %

## 2021-04-20 DIAGNOSIS — F33.41 RECURRENT MAJOR DEPRESSIVE DISORDER, IN PARTIAL REMISSION (HCC): ICD-10-CM

## 2021-04-20 DIAGNOSIS — E03.4 HYPOTHYROIDISM DUE TO ACQUIRED ATROPHY OF THYROID: ICD-10-CM

## 2021-04-20 DIAGNOSIS — I10 ESSENTIAL HYPERTENSION: ICD-10-CM

## 2021-04-20 DIAGNOSIS — U07.1 COVID-19: ICD-10-CM

## 2021-04-20 DIAGNOSIS — Z00.00 ENCOUNTER FOR PREVENTIVE CARE: Primary | ICD-10-CM

## 2021-04-20 DIAGNOSIS — R73.01 IFG (IMPAIRED FASTING GLUCOSE): ICD-10-CM

## 2021-04-20 LAB
ALBUMIN/GLOBULIN RATIO: 2.5 RATIO (ref 0.8–2.6)
ALBUMIN: 4.3 G/DL (ref 3.5–5.2)
ALP BLD-CCNC: 80 U/L (ref 23–144)
ALT SERPL-CCNC: 14 U/L (ref 0–60)
AST SERPL-CCNC: 15 U/L (ref 0–55)
BASOPHILS ABSOLUTE: 0 K/MM3 (ref 0–0.3)
BASOPHILS RELATIVE PERCENT: 0.7 % (ref 0–2)
BILIRUB SERPL-MCNC: 0.2 MG/DL (ref 0–1.2)
BUN BLDV-MCNC: 18 MG/DL (ref 3–29)
BUN/CREAT BLD: 26 (ref 7–25)
CALCIUM SERPL-MCNC: 9 MG/DL (ref 8.5–10.5)
CHLORIDE BLD-SCNC: 106 MEQ/L (ref 96–110)
CHOLESTEROL: 208 MG/DL
CO2: 25 MEQ/L (ref 19–32)
CREAT SERPL-MCNC: 0.7 MG/DL (ref 0.5–1.2)
DIFFERENTIAL TYPE: NORMAL
EOSINOPHILS ABSOLUTE: 0.1 K/MM3 (ref 0–0.6)
EOSINOPHILS RELATIVE PERCENT: 2.2 % (ref 0–7)
FASTING STATUS: ABNORMAL
GFR AFRICAN AMERICAN: 115 MLS/MIN/1.73M2
GFR NON-AFRICAN AMERICAN: 99 MLS/MIN/1.73M2
GLOBULIN: 1.7 G/DL (ref 1.9–3.6)
GLUCOSE BLD-MCNC: 102 MG/DL (ref 70–99)
HBA1C MFR BLD: 5.6 % (ref 4–6)
HCT VFR BLD CALC: 41.2 % (ref 35–46)
HDLC SERPL-MCNC: 59 MG/DL
HEMOGLOBIN: 13.9 G/DL (ref 12–15.6)
LDL CHOLESTEROL CALCULATED: 117 MG/DL
LYMPHOCYTES ABSOLUTE: 2.1 K/MM3 (ref 0.9–4.1)
LYMPHOCYTES RELATIVE PERCENT: 32.4 % (ref 18–47)
MCH RBC QN AUTO: 32 PG (ref 27–33)
MCHC RBC AUTO-ENTMCNC: 33.8 G/DL (ref 32–36)
MCV RBC AUTO: 94.7 FL (ref 80–100)
MONOCYTES ABSOLUTE: 0.3 K/MM3 (ref 0.2–1.1)
MONOCYTES RELATIVE PERCENT: 4.9 % (ref 0–14)
NEUTROPHILS ABSOLUTE: 3.9 K/MM3 (ref 1.5–7.8)
NEUTROPHILS RELATIVE PERCENT: 59.8 % (ref 40–75)
PDW BLD-RTO: 12.6 % (ref 9–15)
PLATELET # BLD: 280 K/MM3 (ref 130–400)
POTASSIUM SERPL-SCNC: 4.5 MEQ/L (ref 3.4–5.3)
RBC # BLD: 4.35 M/MM3 (ref 3.9–5.2)
SODIUM BLD-SCNC: 140 MEQ/L (ref 135–148)
STATUS: ABNORMAL
TOTAL PROTEIN: 6 G/DL (ref 6–8.3)
TRIGL SERPL-MCNC: 161 MG/DL
TSH SERPL DL<=0.05 MIU/L-ACNC: 1.64 MCIU/ML (ref 0.4–4.5)
VLDLC SERPL CALC-MCNC: 32 MG/DL (ref 4–38)
WBC: 6.5 K/MM3 (ref 3.8–10.8)

## 2021-04-20 PROCEDURE — 99396 PREV VISIT EST AGE 40-64: CPT | Performed by: INTERNAL MEDICINE

## 2021-04-20 NOTE — PROGRESS NOTES
Do Griggs  1965  04/20/21    SUBJECTIVE:    Pt had covid in November. She has noticed that she continues to have some cognitive effects (brain fog) and fatigue. She also has a mild residual cough. She is not sure if this is improving. The patient is taking hypertensive medications compliantly without side effects. Denies chest pain, dyspnea, edema, or TIA's. Blood pressure has been 975-171 systolic. Dianne Conklin Pt continues on synthroid for hypothyroidism. She has gained 5 more pounds over the last year. She is not getting exercise. Her eating habits are not the best.     OBJECTIVE:    /80   Pulse 65   Resp 18   Wt 223 lb (101.2 kg)   SpO2 98%   BMI 35.99 kg/m²     Physical Exam  Constitutional:       Appearance: She is well-developed. Eyes:      General: No scleral icterus. Conjunctiva/sclera: Conjunctivae normal.   Neck:      Musculoskeletal: Neck supple. Thyroid: No thyromegaly. Trachea: No tracheal deviation. Cardiovascular:      Rate and Rhythm: Normal rate and regular rhythm. Heart sounds: No murmur. No friction rub. No gallop. Pulmonary:      Effort: No respiratory distress. Breath sounds: No wheezing or rales. Abdominal:      General: Bowel sounds are normal. There is no distension. Palpations: Abdomen is soft. There is no hepatomegaly or mass. Tenderness: There is no abdominal tenderness. There is no guarding or rebound. Lymphadenopathy:      Cervical: No cervical adenopathy. Skin:     Nails: There is no clubbing. Neurological:      Mental Status: She is alert and oriented to person, place, and time. Psychiatric:         Behavior: Behavior normal.         Judgment: Judgment normal.         ASSESSMENT:    1. Encounter for preventive care    2. Recurrent major depressive disorder, in partial remission ([facility] [address]    3. Essential hypertension    4. Hypothyroidism due to acquired atrophy of thyroid    5. COVID-19    6.  IFG (impaired fasting glucose)        PLAN:    Evelio Mccall was seen today for other. Diagnoses and all orders for this visit:    Encounter for preventive care - encourage exercise; discussed nutrion of wt loss clinic, pt declines for now; BP at goal; check labs; up to date on c-scope; encourage mammo, covid vaccine  -     Comprehensive Metabolic Panel; Future  -     Lipid Panel; Future  -     Hemoglobin A1C; Future  -     CBC Auto Differential; Future  -     TSH without Reflex; Future  -     HEPATITIS C ANTIBODY; Future    Recurrent major depressive disorder, in partial remission ([facility] [address] - doing well, no change    Essential hypertension - at goal; no change  -     Comprehensive Metabolic Panel; Future  -     Lipid Panel; Future  -     CBC Auto Differential; Future    Hypothyroidism due to acquired atrophy of thyroid - check TSH  -     TSH without Reflex;  Future    COVID-19 - will refer to ST for long haul covid symptoms of \"brain fog\"  -     [employer]    IFG (impaired fasting glucose) - check a1c  -     Hemoglobin A1C; Future

## 2021-04-21 LAB — HEPATITIS C ANTIBODY: NEGATIVE

## 2021-04-29 ENCOUNTER — HOSPITAL ENCOUNTER (OUTPATIENT)
Dept: SPEECH THERAPY | Age: 56
Setting detail: THERAPIES SERIES
Discharge: HOME | End: 2021-04-29
Payer: COMMERCIAL

## 2021-04-29 DIAGNOSIS — U07.1 COVID-19: Primary | ICD-10-CM

## 2021-04-29 PROCEDURE — 92523 SPEECH SOUND LANG COMPREHEN: CPT

## 2021-04-29 NOTE — PLAN OF CARE
[x][facility] [address]      [facility] [address] Dept       Outpatient Pediatric Dept     [address] [facility]       [facility], [address], [address]       [address]     [phone]  Fax [phone] [phone]    [][facility] [address]               [][facility]          Outpatient Speech Dept. [address] [address]            Outpatient Pediatric Rehab Dept     [facility]             [facility], [address]             [facility], [address]       [phone] [phone] [phone]     Physician: Dr Marcela Melgar    From: Chandra Stanford 87, CCC-SLP     Patient: Damion Garcia     : 1965  Medical Diagnosis and ICD 10: HWDMS-00 U07.1  Date: 2021  Date of Initial Eval:2021  Treatment Diagnosis and ICD10: cognitive communication deficit R41.841       Attention and concentration deficit R41.840    Speech Therapy Certification/Re-Certification Form    Dear Dr. Marcela Melgar  The following patient has been evaluated for speech therapy services and for therapy to continue, insurance requires physician review of the treatment plan initially and every 90 days. Please review the attached evaluation and/or summary of the patient's plan of care, and verify that you agree therapy should continue by signing the attached document and sending it back to our office. Plan of Care/Treatment to date:  [x] Cognitive-Linguistic Skills Development            Dates of service in current plan: 2021-2021  # of visits: 16     Goals:          Short-term Goals  Timeframe for Short-term Goals: 3 months  Goal 1:  Will demonstrate intact divided attention skills for daily personal, social and vocational environments  Goal 2: Word fluency will improve to 20 or more items within a category within one minute across three categories  Goal 3: Word choice within connected speech sample of 15 or more minutes witll be accurate with 95% consistency. Goal 4: Will independently employ strategies to faciliate word choice and facilitate attention skills within daily activities per patient report. Goal 5: Will demonstrate intact short term memory skills as demonstrated by recall of three tasks to complete given a 15 minute time interval             Frequency/Duration:  # Days per week: [] 1 day # Weeks: [] 1 week [] 5 weeks     [x] 2 days? [] 2 weeks [] 6 weeks     [] 3 days   [] 3 weeks [] 7 weeks     [] 4 days   [] 4 weeks [x] 8 weeks         [] 9 weeks [] 10 weeks         [] 11 weeks [] 12 weeks    Rehab Potential: [] Excellent [x] Good [] Fair  [] Poor       Electronically signed by:    Ruth Godinez. MS Steven, CCC-SLP  Speech/Language Pathologist  4/29/2021  4:04 PM    If you have any questions or concerns, please don't hesitate to call.   Thank you for your referral.      Physician Signature:__________________Date:___________ Time: __________  By signing above, therapists plan is approved by physician

## 2021-04-29 NOTE — PROGRESS NOTES
Speech Language Pathology  Facility/Department: [address] [facility]  Initial Assessment    NAME: Vivian Heading  : 1965  MRN: [id number]    Date of Eval: 2021  Evaluating Therapist:  Michael Goncalves MS CCC-SLP    Visit Diagnoses       Codes    COVID-19    -  Primary U07.1            Past Medical History: has a past medical history of Depression, Hypertension, Irregular periods, and Thyroid disease. Past Surgical History:  has a past surgical history that includes Appendectomy (); shoulder surgery (Right, ); Cholecystectomy (2013); other surgical history; Colonoscopy (2017); hysteroscopy (2017); vazquez and bso (cervix removed) (2017); and Foot surgery (Right, 2019). Primary Complaint: \"I find myself at a loss for words . There will be a long pause. I'm easily distracted and I have trouble concentrating and focusing\". Patient goal- \" To improve my communication with others and to improve my concentration and focusing abilities\". Pain: no pain       Assessment:  Cognitive Diagnosis: Mild cognitive impairment characterized by impairments of attention and concentration and short term memory skills. Additional assessment of attention skills to be completed within treatment session to determine the level of attention to address in treatment  Aphasia Diagnosis: Mild expressive language impairment characterized by reduced word retrieval skills and word fluency. Diagnosis: Jordyn Ruano exhibits an impairment of cognitive skills effecting attention and memory skills particularly short term memory. A mild expressive language impairment is present and is characterized by impairment of word retrieval skills and reduced word fluency. Subjective:   Previous level of function and limitations: independent prior to COVID-19 which she reported that she experienced in 2020.    General  Chart Reviewed: Yes  Patient assessed for rehabilitation services?: Yes  Family / Caregiver Present: No  Vital Signs  Patient Currently in Pain: No  Social/Functional History  Lives With: Spouse  Type of Home: House  Active : Yes  Mode of Transportation: Truck  Education: bachelor's degree- interior design  Occupation: Full time employment  Type of occupation:   IADL History  Active : Yes  Mode of Transportation: Truck  Education: bachelor's degree- interior design  Occupation: Full time employment  Type of occupation:   Vision  Vision: Impaired  Vision Exceptions: Wears glasses at all times  Hearing  Hearing: Within functional limits           Objective:     Oral/Motor  Oral Motor: Within functional limits  Auditory Comprehension  Comprehension: Within Functional Limits     Expression  Primary Mode of Expression: Verbal  Verbal Expression  Verbal Expression: Exceptions to functional limits  Convergent: Mild(reduced word fluency skills. Within one minute, Mirtha generated the following withinone minute each: words that begin with \"G\" -18, animals-21, words that begin with \"M\"-8)  Divergent: Mild  Conversation: Mild  Interfering Components: Attention; Impaired thought organization  Effective Techniques: Word retrived strategies;Provide extra time     Motor Speech  Motor Speech: Within Functional Limits  Pragmatics/Social Functioning  Pragmatics: Within functional limits       Cognition  Orientation  Overall Orientation Status: Within Normal Limits  Attention  Attention: Exceptions to WFL(patient reports difficulty with concentration and focus- will determine the level at which attention is breaking down within initial treatment sessions)  Alternating Attention: To be assessed in therapy  Divided Attention: To be assessed in therapy  Selective Attention: To be assessed in therapy  Sustained Attention: To be assessed in therapy  Memory  Memory: Exceptions to [facility]  Prospective Memory: To be assessed in therapy  Short-term Memory: Mild  Working Memory:  To be assessed in therapy  Problem Solving  Problem Solving: Within Functional Limits  Abstract Reasoning  Abstract Reasoning: Within Functional Limits  Safety/Judgement  Safety/Judgement: Within Functional Limits    Additional Assessments:    The Tawanda Cognitive Assessment (MoCA)  Version 8.3 was administered 4/29/2021 as a rapid screening instrument for mild cognitive dysfunction. It assesses different cognitive domains: attention and concentration, executive functions, memory, language, visuoconstructional skills, conceptual thinking, calculations, and orientation. The total possible score = 30 points  26 or above is considered normal  Below 26 indicates suggested cognitive impairment. 18-25 Mild cognitive impairment  10-17 Moderate cognitive impairment  -10 Severe cognitive impairment      Target Word   Category Cue   Multiple Choice      LEG   body part   hand, leg, face (shoulder, nose)      COTTON  type of fabric   silk, cotton, nylon, (velvet, jamie      SCHOOL  type of building    school, [facility] [address] ([employer] store)      TOMATO   type of food  lettuce, tomato, carrot (cucumber, celery)      WHITE  color   purple, white, green (yellow, red)       Mental Index Score:   MIS scoring Total      Number of words recalled spontaneously   3  multiplied by   3  9     Number of words recalled with a category cue   0  multiplied by   2  0     Number of words recalled with a multiple choice cue   2  multiplied by   1   2     Total MIS (add all points)   11/15          Mirtha OMAR Mar scored  25/30  indicating a mild cognitive impairment  Strengths:visuospatial, naming, language, abstraction, orientation  Weaknesses:attention, delayed recall      Subtests of the Cognitive Linguistic Quick Test were administered. Maria Del Rosario Denis attained a score that was at the cutoff or above for the subtests in the domains of Attention, memory, executive functions, language and visospatial skills.  This test was not sensitive to the reported

## 2021-05-03 ENCOUNTER — HOSPITAL ENCOUNTER (OUTPATIENT)
Dept: SPEECH THERAPY | Age: 56
Setting detail: THERAPIES SERIES
Discharge: HOME | End: 2021-05-03
Payer: COMMERCIAL

## 2021-05-03 PROCEDURE — 97129 THER IVNTJ 1ST 15 MIN: CPT

## 2021-05-03 PROCEDURE — 97130 THER IVNTJ EA ADDL 15 MIN: CPT

## 2021-05-03 NOTE — FLOWSHEET NOTE
2-3 abstract meanings for these two words. Goal 3: Word choice within connected speech sample of 15 or more minutes witll be accurate with 95% consistency. See goal #2. Goal 4: Will independently employ strategies to faciliate word choice and facilitate attention skills within daily activities per patient report. Provided instruction regarding the [employer]. Goal 5: Will demonstrate intact short term memory skills as demonstrated by recall of three tasks to complete given a 15 minute time interval Provided instruction and a handout regarding the Spaced Retrieval strategy to facilitate short term memory skills. At the 5 minute, 10 minute and 15 minute time intervals, Mirtha recalled the three tasks that she had to complete with prompt response and accuracy. Provided instruction regarding the use of visual imagery as a strategy to facilitate recall. Effective Strategies that Improve fx: Use of Spaced Retrieval, visual imagery, Semantic Feature Analysis, written cues to assist recall. Barriers to progress: none    Education completed:     Reviewed treatment plan, goals, progress in session and home program activities to improve short term memory and word retrieval skills. Home Exercise Plan:     Word retrieval activities and use of Spaced Retrieval to improve word retrieval skills and short term memory.        Objective Findings: see above      Interventions used this date:  [] Speech Treatment       [x] Instruction in HEP  [] Group   [] Dysphagia Treatment   [x] Cognitive Skill Gabriel  [] Motor  [] Voice Treatment       []  AAC  Other:      Communication with other providers: none    Adverse Reactions to treatment: none     Treatment/Activity Tolerance:     [x]  Patient tolerated treatment well []  Patient limited by fatique    []  Patient limited by pain []  Patient limited by other medical complications   []  Other:     Patient Requires Follow-up:  [x]  Yes  []  No    Plan: [x]

## 2021-05-06 ENCOUNTER — HOSPITAL ENCOUNTER (OUTPATIENT)
Dept: SPEECH THERAPY | Age: 56
Setting detail: THERAPIES SERIES
Discharge: HOME | End: 2021-05-06
Payer: COMMERCIAL

## 2021-05-06 PROCEDURE — 97130 THER IVNTJ EA ADDL 15 MIN: CPT

## 2021-05-06 PROCEDURE — 97129 THER IVNTJ 1ST 15 MIN: CPT

## 2021-05-10 ENCOUNTER — HOSPITAL ENCOUNTER (OUTPATIENT)
Dept: SPEECH THERAPY | Age: 56
Setting detail: THERAPIES SERIES
Discharge: HOME | End: 2021-05-10
Payer: COMMERCIAL

## 2021-05-10 PROCEDURE — 97130 THER IVNTJ EA ADDL 15 MIN: CPT

## 2021-05-10 PROCEDURE — 97129 THER IVNTJ 1ST 15 MIN: CPT

## 2021-05-10 NOTE — FLOWSHEET NOTE
[x][facility] Afr[facility] [address]      JOJO [facility] [address] Dept          Outpatient Rehab Dept     [address]       [facility], [address], [address]       Joann Mae [address]     [phone] [phone] [phone]  [][facility] Martinrânio Junque[facility] [address]60           Outpatient Speech Dept. [address] [facility], [address] [facility],Third [address]           [phone] [phone]    Speech and Language Pathology Daily Note      Patient Name:  Darrion Amaay    :  1965  Restrictions/Precautions:    Diagnosis/ICD 10 Dx Code from Physician :    COVID-19 [U07.1]   Treatment Diagnosis/ICD10 for ST: cognitive communication deficit R41.841                                                              Attention and concentration deficit Q63.723    Insurance/Certification information: BC/BS -BOMN  Referring Physician:   Dr Reji Acosta of care signed (Y/N):  yes     COVID screening questions were asked and patient attested that there had been no contact or symptoms    Treatment Provided: cognitive treatment    Date 5-3-2021 5-6-2021 5-   Time in/Time out 9:06-9:51AM 3:21-4:12PM 10:02-10:52AM   Total Timed Code Min 45 minutes 51 minutes  50 minutes   Total Treatment Minutes 45 minutes 51 minutes 50 minutes   Units  G Code applied: 3 Cognitive treatment 3 Cognitive treatment 3 cognitive treatment   Subjective     Alert and cooperative Alert and cooperative Alert and cooperative   Pain level: 0 0 0   Visit# / total visits:   2/2 3/3   GOALS:      Goal 1:  Will demonstrate intact divided attention skills for daily personal, social and vocational environments   Not addressed today Played Roseanne Brice in which Mirtha needed to make quick decisions whether the cards in front of her added up to 6 of the same fruit before her opponent made the decision regarding six of the same producing similar words , she was able to generate the target word she wanted to say. Provided Mirtha with pocket sized 3-5 card with Semantic Feature Analysis (SFA) prompts for use when she  exhibits word retrieval difficulties. Goal 4: Will independently employ strategies to faciliate word choice and facilitate attention skills within daily activities per patient report. Provided instruction regarding the [employer]. Sandra Santana reported that she has been employing visual imagery to facilitate her short term memory skills and that this has been helpful to her. Discussed strategies that Mirtha could employ/ trial during work related activities to determine their effectiveness in improving/ maintaining attention skills. Goal 5: Will demonstrate intact short term memory skills as demonstrated by recall of three tasks to complete given a 15 minute time interval Provided instruction and a handout regarding the Spaced Retrieval strategy to facilitate short term memory skills. At the 5 minute, 10 minute and 15 minute time intervals, Mirtha recalled the three tasks that she had to complete with prompt response and accuracy. Provided instruction regarding the use of visual imagery as a strategy to facilitate recall. Intact recall of three tasks to complete given a 15 minute time interval and writing down the tasks at the start of this exercise. Will confirm short term memory skills next session. Assessed immediate, prospective and working memory skills- intact skills. Sandra Santana reported that she has been employing the spaced retrieval strategy and visual imagery to facilitate recall of important information. She indicated that she regularly writes lists to facilitate recall of important information. Effective Strategies that Improve fx: Use of Spaced Retrieval, visual imagery, Semantic Feature Analysis, written cues to assist recall.   Use of Spaced Retrieval, visual imagery, Semantic Feature Analysis, written cues to assist recall. Use of Spaced Retrieval, visual imagery, Semantic Feature Analysis, written cues to assist recall. Barriers to progress: none none none   Education completed:     Reviewed treatment plan, goals, progress in session and home program activities to improve short term memory and word retrieval skills. Reviewed treatment plan, goals, progress in session and home program activities to improve short term memory and word retrieval skills. Reviewed treatment plan, goals, progress in session and home program activities to improve short term memory and word retrieval skills   Home Exercise Plan:     Word retrieval activities and use of Spaced Retrieval to improve word retrieval skills and short term memory. Word retrieval activities and use of Spaced Retrieval to improve word retrieval skills and short term memory. Word retrieval activities and use of Spaced Retrieval to improve word retrieval skills and short term memory. Objective Findings: see above      Interventions used this date:  [] Speech Treatment       [x] Instruction in HEP  [] Group   [] Dysphagia Treatment   [x] Cognitive Skill Gabriel  [] Motor  [] Voice Treatment       []  AAC  Other:      Communication with other providers: none    Adverse Reactions to treatment: none     Treatment/Activity Tolerance:     [x]  Patient tolerated treatment well []  Patient limited by fatique    []  Patient limited by pain []  Patient limited by other medical complications   []  Other:     Patient Requires Follow-up:  [x]  Yes  []  No    Plan: [x]  Continue per plan of care []  Alter current plan (see comments)   [x]  Plan of care initiated []  Hold pending MD visit []  Discharge    Plan for Next Session:  Continue plan of care    Electronically signed by:    Joya Harris MS, CCC-SLP  Speech/Language Pathologist  5/10/2021  12:07 PM

## 2021-05-13 ENCOUNTER — HOSPITAL ENCOUNTER (OUTPATIENT)
Dept: SPEECH THERAPY | Age: 56
Setting detail: THERAPIES SERIES
Discharge: HOME | End: 2021-05-13
Payer: COMMERCIAL

## 2021-05-13 PROCEDURE — 97129 THER IVNTJ 1ST 15 MIN: CPT

## 2021-05-13 PROCEDURE — 97130 THER IVNTJ EA ADDL 15 MIN: CPT

## 2021-05-13 NOTE — FLOWSHEET NOTE
cards in front of her added up to 6 of the same fruit before her opponent made the decision regarding six of the same fruit. On 70% of the trials, she was successful in \"ringing the bell\" before her opponent \"rang the monzon\". Tiffanie Caceres reported that she has been distracted at times when she is completing tasks. However at other times she reported that she can attend to a task for an extended period of time. She reported that she cannot determine when attention may be \"good\" and when she may have difficulty attending. Completed a divided attention task in which Tiffanie Caceres had to determine the predetermined  rule regarding  playing cards such as \"only red even hearts\". Tiffanie Caceres demonstrated intact determination of \"rule\" across two trials in a timely fashion. Tiffanie Caceres reported that she feels that her divided attention skills have improved to the level that she is able to execute her daily activities related to social, personal and vocational environments effectively. She reported that when she uses her strategies that she is successful. GOAL MET   Goal 2: Word fluency will improve to 20 or more items within a category within one minute across three categories Provided instruction and an handout regarding the evidence based treatment strategy- Semantic Feature Analysis. Abstract categories- \"cold\", \"run\". Mirtha generated three concrete meanings for each of theses categories and with minimal to moderate prompts was able generate 2-3 abstract meanings for these two words. Tiffanie Caceres completed the assigned home practice activities to improve word retrieval skills with 100% accuracy. These included adding item to category, stating the category given three items, completing analogies and  stating opposites. Tiffanie Caceres completed home program word retrieval activities to improve theses skills. She demonstrated accurate responses to activities that built associations between words.  Within the session, she demonstrated the ability to generate concrete and abstract meanings for the target word \"green\" with minimal delay X6 Post Test completed- results as follows: words starting with the letter \"b\"- 15 (baseline-18), words starting with the letter \"m\"- 12 (prestest-8), animals- 18, (prestest- 21). Goal was not met at this level, however Janet Duarte reported that she has had few word retrieval difficulties during the last few days. Goal 3: Word choice within connected speech sample of 15 or more minutes witll be accurate with 95% consistency. See goal #2. See goal #2 at the end of the session, Janet Duarte demonstrated word retrieval difficulty when explaining why she believes that the symptoms that she has been experiencing are \"real\" to her. After about a minute and producing similar words , she was able to generate the target word she wanted to say. Provided Mirtha with pocket sized 3-5 card with Semantic Feature Analysis (SFA) prompts for use when she  exhibits word retrieval difficulties. During a connected speech sample of 30 minutes, Janet Duarte  demonstrated accurate word choice with 100% consistency. GOAL MET   Goal 4: Will independently employ strategies to faciliate word choice and facilitate attention skills within daily activities per patient report. Provided instruction regarding the [employer]. Janet Duarte reported that she has been employing visual imagery to facilitate her short term memory skills and that this has been helpful to her. Discussed strategies that Mirtha could employ/ trial during work related activities to determine their effectiveness in improving/ maintaining attention skills. Janet Duarte reported that she has been employing the [employer] Analysis strategy when she has encountered word retrieval difficulties this past week. However she reported that she has been experiencing few instances of word retrieval difficulty.  She reported that she has been prioritizing the tasks that she has to complete and completing only one task at a time and that use of

## 2021-05-17 NOTE — PROGRESS NOTES
[][facility] utor [address]ina [address]60      JOJO VALERIO [facility]     [address] [facility]       [facility], [address], [address]       Joann Mae [address]     [phone] [phone] [phone]  [][facility] [address]      [x][facility]          Outpatient Speech Dept. [facility]      Outpatient Adult [address] [place] [address] [address]                                        [address], [address]     [phone] [phone] [phone]    Speech Therapy  [] Progress                                                [x] Discharge Note    Physician:  Dr Leann Sullivan     From: Chandra Ballard 87 CCC-SLP   Patient: Alondra Sales       : 1965  Diagnosis/  ICD10: VJFDN-06 [U07.1]    Date: 2021  Treatment Diagnosis/ ICD 10:cognitive communication deficit R41. 841                                                              Attention and concentration deficit R41.840     Plan of Care/Treatment to date:   [x] Cognitive-Linguistic Skills Development       Significant Findings At Last Visit/Comments:    Maria Del Rosario Denis reported that she feels that her divided attention skills have improved to the level that she is able to execute her daily activities related to social, personal and vocational environments effectively. She reported that when she uses her strategies that she is successful. Progress toward goals:  Goal 1: Will demonstrate intact divided attention skills for daily personal, social and vocational environments-Mirtha reported that she feels that her divided attention skills have improved to the level that she is able to execute her daily activities related to social, personal and vocational environments effectively.  She reported that when she uses her strategies that she is successful. GOAL MET    Goal 2: Word fluency will improve to 20 or more items within a category within one minute across three categories-Post Test completed- results as follows: words starting with the letter \"b\"- 15 (baseline-18), words starting with the letter \"m\"- 12 (prestest-8), animals- 18, (prestest- 21). Goal was not met at this level, however Linda Laguna reported that she has had few word retrieval difficulties during the last few days. Goal 3: Word choice within connected speech sample of 15 or more minutes witll be accurate with 95% consistency. During a connected speech sample of 30 minutes, Linda Laguna  demonstrated accurate word choice with 100% consistency. GOAL MET    Goal 4: Will independently employ strategies to faciliate word choice and facilitate attention skills within daily activities per patient report. Linda Laguna reported that she has been employing the [employer] Analysis strategy when she has encountered word retrieval difficulties this past week. However she reported that she has been experiencing few instances of word retrieval difficulty. She reported that she has been prioritizing the tasks that she has to complete and completing only one task at a time and that use of this strategy has been allowing her to complete tasks accurately and in a timely manner. Goal 5: Will demonstrate intact short term memory skills as demonstrated by recall of three tasks to complete given a 15 minute time interval- GOAL MET       Frequency/Duration:  # Days per week: [] 1 day # Weeks: [] 1 week [] 4 weeks      [x] 2 days? [x] 2 weeks [] 5 weeks     [] 3 days   [] 3 weeks [] 6 weeks     Rehab Potential: [] Excellent [x] Good [] Fair  [] Poor     Goal Status:  [] Achieved [x] Partially Achieved  [] Not Achieved     Patient Status: [] Continue per initial plan of Care     [x] Patient now discharged             Electronically signed by:    Shirlene Harris MS, CCC-SLP  Speech/Language Pathologist  5/17/2021 4:37 PM

## 2021-11-22 ENCOUNTER — OFFICE VISIT (OUTPATIENT)
Dept: OBGYN | Age: 56
End: 2021-11-22
Payer: COMMERCIAL

## 2021-11-22 VITALS
HEIGHT: 66 IN | DIASTOLIC BLOOD PRESSURE: 74 MMHG | SYSTOLIC BLOOD PRESSURE: 126 MMHG | BODY MASS INDEX: 35.68 KG/M2 | WEIGHT: 222 LBS

## 2021-11-22 DIAGNOSIS — Z01.419 WOMEN'S ANNUAL ROUTINE GYNECOLOGICAL EXAMINATION: Primary | ICD-10-CM

## 2021-11-22 DIAGNOSIS — N95.1 MENOPAUSAL SYMPTOM: ICD-10-CM

## 2021-11-22 PROCEDURE — 99396 PREV VISIT EST AGE 40-64: CPT | Performed by: OBSTETRICS & GYNECOLOGY

## 2021-11-22 SDOH — ECONOMIC STABILITY: FOOD INSECURITY: WITHIN THE PAST 12 MONTHS, YOU WORRIED THAT YOUR FOOD WOULD RUN OUT BEFORE YOU GOT MONEY TO BUY MORE.: NEVER TRUE

## 2021-11-22 SDOH — ECONOMIC STABILITY: FOOD INSECURITY: WITHIN THE PAST 12 MONTHS, THE FOOD YOU BOUGHT JUST DIDN'T LAST AND YOU DIDN'T HAVE MONEY TO GET MORE.: NEVER TRUE

## 2021-11-22 ASSESSMENT — SOCIAL DETERMINANTS OF HEALTH (SDOH): HOW HARD IS IT FOR YOU TO PAY FOR THE VERY BASICS LIKE FOOD, HOUSING, MEDICAL CARE, AND HEATING?: NOT HARD AT ALL

## 2021-11-22 NOTE — PROGRESS NOTES
levothyroxine (SYNTHROID) 175 MCG tablet TAKE 1 TABLET DAILY 90 tablet 3    estrogens, conjugated, (PREMARIN) 0.625 MG tablet Take 1 tablet by mouth daily 30 tablet 11    UNABLE TO FIND Please administer two SHINGRIX vaccines 2-6 months apart (Patient not taking: Reported on 2020) 2 Units 0    fluocinonide (LIDEX) 0.05 % cream Apply topically 2 times daily. 30 g 1    diclofenac sodium 1 % GEL Apply 2 g topically 4 times daily Apply to affect heal on area on the right heal four times daily 1 Tube 5    loratadine (CLARITIN) 10 MG tablet Take 10 mg by mouth daily      multivitamin (THERAGRAN) per tablet Take 1 tablet by mouth daily. No current facility-administered medications for this visit. Allergies   Allergen Reactions    Doxycycline      vertigo           Immunization History   Administered Date(s) Administered    Tdap (Boostrix, Adacel) 2019       Review of Systems   All other systems reviewed and are negative. /74   Ht 5' 6\" (1.676 m)   Wt 222 lb (100.7 kg)   BMI 35.83 kg/m²     Physical Exam  Nursing note reviewed. Exam conducted with a chaperone present. HENT:      Head: Normocephalic. Nose: Nose normal.   Eyes:      Extraocular Movements: Extraocular movements intact. Pulmonary:      Effort: Pulmonary effort is normal.   Abdominal:      General: Abdomen is flat. Palpations: Abdomen is soft. Hernia: There is no hernia in the left inguinal area or right inguinal area. Genitourinary:     General: Normal vulva. Exam position: Lithotomy position. Pubic Area: No rash or pubic lice. Labia:         Right: No rash, tenderness, lesion or injury. Left: No rash, tenderness, lesion or injury. Urethra: No prolapse, urethral pain, urethral swelling or urethral lesion.       Vagina: Normal.      Cervix: Normal.      Uterus: Normal.       Adnexa: Right adnexa normal and left adnexa normal.      Rectum: Normal. Musculoskeletal:      Cervical back: Normal range of motion. Lymphadenopathy:      Lower Body: No right inguinal adenopathy. No left inguinal adenopathy. Skin:     General: Skin is warm. Neurological:      Mental Status: She is alert. No results found for this visit on 11/22/21. ASSESSMENT AND PLAN   Diagnosis Orders   1. Women's annual routine gynecological examination  DEXA BONE DENSITY AXIAL SKELETON   2. Menopausal symptom         Return in about 1 year (around 11/22/2022) for follow up appointment.     Roxane Peña MD

## 2022-01-04 ENCOUNTER — TELEMEDICINE (OUTPATIENT)
Dept: INTERNAL MEDICINE CLINIC | Age: 57
End: 2022-01-04
Payer: COMMERCIAL

## 2022-01-04 DIAGNOSIS — J01.00 ACUTE NON-RECURRENT MAXILLARY SINUSITIS: Primary | ICD-10-CM

## 2022-01-04 PROCEDURE — 99213 OFFICE O/P EST LOW 20 MIN: CPT | Performed by: INTERNAL MEDICINE

## 2022-01-04 RX ORDER — AMOXICILLIN 500 MG/1
500 CAPSULE ORAL 3 TIMES DAILY
Qty: 30 CAPSULE | Refills: 0 | Status: SHIPPED | OUTPATIENT
Start: 2022-01-04 | End: 2022-01-14

## 2022-01-04 RX ORDER — FLUTICASONE PROPIONATE 50 MCG
2 SPRAY, SUSPENSION (ML) NASAL DAILY
Qty: 16 G | Refills: 5 | Status: SHIPPED | OUTPATIENT
Start: 2022-01-04 | End: 2022-07-22

## 2022-01-04 ASSESSMENT — PATIENT HEALTH QUESTIONNAIRE - PHQ9
SUM OF ALL RESPONSES TO PHQ QUESTIONS 1-9: 1
SUM OF ALL RESPONSES TO PHQ QUESTIONS 1-9: 1
2. FEELING DOWN, DEPRESSED OR HOPELESS: 1
SUM OF ALL RESPONSES TO PHQ QUESTIONS 1-9: 1
1. LITTLE INTEREST OR PLEASURE IN DOING THINGS: 0
SUM OF ALL RESPONSES TO PHQ QUESTIONS 1-9: 1
SUM OF ALL RESPONSES TO PHQ9 QUESTIONS 1 & 2: 1

## 2022-01-04 NOTE — PROGRESS NOTES
2022    TELEHEALTH EVALUATION -- Audio/Visual (During ZGJJP-27 public health emergency)    HPI:    Shannan Moreira (:  1965) has requested an audio/video evaluation for the following concern(s):    Pt complains that Saturday she developed ear pain, PND, sore throat, clear rhinorrhea, sinus pressure, low grade fever, dry cough, arthralgias that have resolved. She denies SOB, wheezing, N/V, diarrhea, loss of taste or smell. She has used sinus rinse, tylenol, cough drops. She will be tested tomorrow. Review of Systems    Prior to Visit Medications    Medication Sig Taking? Authorizing Provider   amoxicillin (AMOXIL) 500 MG capsule Take 1 capsule by mouth 3 times daily for 10 days Yes Sesar Kuhn MD   fluticasone [facility]) 50 MCG/ACT nasal spray 2 sprays by Each Nostril route daily Yes Sesar Kuhn MD   estrogens, conjugated, (PREMARIN) 0.625 MG tablet Take 1 tablet by mouth daily Yes Sawyer Talley MD   escitalopram (LEXAPRO) 20 MG tablet TAKE 1 TABLET DAILY Yes Sesar Kuhn MD   lisinopril (PRINIVIL;ZESTRIL) 20 MG tablet TAKE 1 TABLET DAILY Yes Sesar Kuhn MD   levothyroxine (SYNTHROID) 175 MCG tablet TAKE 1 TABLET DAILY Yes Sesar Kuhn MD   fluocinonide (LIDEX) 0.05 % cream Apply topically 2 times daily. Yes Sesar Kuhn MD   diclofenac sodium 1 % GEL Apply 2 g topically 4 times daily Apply to affect heal on area on the right heal four times daily Yes Eri Wright MD   loratadine (CLARITIN) 10 MG tablet Take 10 mg by mouth daily Yes Historical Provider, MD   multivitamin [facility]) per tablet Take 1 tablet by mouth daily.    Yes Historical Provider, MD   UNABLE TO FIND Please administer two SHINGRIX vaccines 2-6 months apart  Patient not taking: Reported on 2020  Sesar Kuhn MD       Social History     Tobacco Use    Smoking status: Never Smoker    Smokeless tobacco: Never Used   Substance Use Topics    Alcohol use: No     Comment: \"quit drinking 8 yrs ago- use to drink 3-4 times per week- average 3 beers\"    Drug use: No          PHYSICAL EXAMINATION:    Constitutional: [x] Appears well-developed and well-nourished [x] No apparent distress      [] Abnormal-   Mental status  [x] Alert and awake  [x] Oriented to person/place/time [x]Able to follow commands             Psychiatric:       [x] Normal Affect [x] No Hallucinations  Other pertinent observable physical exam findings-     ASSESSMENT/PLAN:  1. Acute non-recurrent maxillary sinusitis - will check for covid, but most likely sinusitis. Tx with amox, flonase, sinus rinse. Pt will inform me about results of covid test  - amoxicillin (AMOXIL) 500 MG capsule; Take 1 capsule by mouth 3 times daily for 10 days  Dispense: 30 capsule; Refill: 0  - fluticasone (FLONASE) 50 MCG/ACT nasal spray; 2 sprays by Each Nostril route daily  Dispense: 16 g; Refill: 5      No follow-ups on file. Lara Aliciageorge, was evaluated through a synchronous (real-time) audio-video encounter. The patient (or guardian if applicable) is aware that this is a billable service. Verbal consent to proceed has been obtained within the past 12 months. The visit was conducted pursuant to the emergency declaration under the [address] [place] [address], [address] [place] [address] authority and the [employer] Act. Patient identification was verified, and a caregiver was present when appropriate. The patient was located in a state where the provider was credentialed to provide care. Total time spent on this encounter: Not billed by time    --Jeremy Boone MD on 1/4/2022 at 10:23 AM    An electronic signature was used to authenticate this note.

## 2022-01-28 ENCOUNTER — HOSPITAL ENCOUNTER (OUTPATIENT)
Dept: WOMENS IMAGING | Age: 57
Discharge: HOME | End: 2022-01-28
Payer: COMMERCIAL

## 2022-01-28 DIAGNOSIS — Z01.419 WOMEN'S ANNUAL ROUTINE GYNECOLOGICAL EXAMINATION: ICD-10-CM

## 2022-01-28 PROCEDURE — 77080 DXA BONE DENSITY AXIAL: CPT

## 2022-02-16 DIAGNOSIS — I10 ESSENTIAL HYPERTENSION: ICD-10-CM

## 2022-02-16 DIAGNOSIS — E03.4 HYPOTHYROIDISM DUE TO ACQUIRED ATROPHY OF THYROID: ICD-10-CM

## 2022-02-16 RX ORDER — LISINOPRIL 20 MG/1
TABLET ORAL
Qty: 90 TABLET | Refills: 3 | Status: SHIPPED | OUTPATIENT
Start: 2022-02-16

## 2022-02-16 RX ORDER — LEVOTHYROXINE SODIUM 175 UG/1
TABLET ORAL
Qty: 90 TABLET | Refills: 3 | Status: SHIPPED | OUTPATIENT
Start: 2022-02-16

## 2022-04-05 DIAGNOSIS — F33.41 RECURRENT MAJOR DEPRESSIVE DISORDER, IN PARTIAL REMISSION (HCC): ICD-10-CM

## 2022-04-05 RX ORDER — ESCITALOPRAM OXALATE 20 MG/1
TABLET ORAL
Qty: 90 TABLET | Refills: 3 | Status: SHIPPED | OUTPATIENT
Start: 2022-04-05 | End: 2022-05-05

## 2022-04-21 ENCOUNTER — OFFICE VISIT (OUTPATIENT)
Dept: INTERNAL MEDICINE CLINIC | Age: 57
End: 2022-04-21
Payer: COMMERCIAL

## 2022-04-21 VITALS
HEART RATE: 64 BPM | BODY MASS INDEX: 36.38 KG/M2 | RESPIRATION RATE: 12 BRPM | SYSTOLIC BLOOD PRESSURE: 124 MMHG | DIASTOLIC BLOOD PRESSURE: 64 MMHG | WEIGHT: 225.4 LBS | OXYGEN SATURATION: 98 %

## 2022-04-21 DIAGNOSIS — F33.41 RECURRENT MAJOR DEPRESSIVE DISORDER, IN PARTIAL REMISSION (HCC): ICD-10-CM

## 2022-04-21 DIAGNOSIS — I10 PRIMARY HYPERTENSION: ICD-10-CM

## 2022-04-21 DIAGNOSIS — R73.01 IFG (IMPAIRED FASTING GLUCOSE): ICD-10-CM

## 2022-04-21 DIAGNOSIS — Z00.00 ENCOUNTER FOR PREVENTIVE CARE: Primary | ICD-10-CM

## 2022-04-21 DIAGNOSIS — E55.9 VITAMIN D DEFICIENCY: ICD-10-CM

## 2022-04-21 DIAGNOSIS — Z12.31 ENCOUNTER FOR SCREENING MAMMOGRAM FOR BREAST CANCER: ICD-10-CM

## 2022-04-21 DIAGNOSIS — E03.4 HYPOTHYROIDISM DUE TO ACQUIRED ATROPHY OF THYROID: ICD-10-CM

## 2022-04-21 PROCEDURE — 99396 PREV VISIT EST AGE 40-64: CPT | Performed by: INTERNAL MEDICINE

## 2022-04-21 NOTE — PROGRESS NOTES
[facility]  1965  04/21/22    SUBJECTIVE:    Pt with a bump on her scalp present for a couple of years, seems to be getting bigger and more firm. There is no pain. Pt with intermittent sharp pain in her ears bilat, as well as muffled hearing. She has rhinorrhea, nasal congestion. Pt continues on synthroid for hypothyroidism    The patient is taking hypertensive medications compliantly without side effects. Denies chest pain, dyspnea, edema, or TIA's. Blood pressure has been 140/85. Mood is not doing \"real great. \"  She finds that she struggles with motivation and fatigue. OBJECTIVE:    /64   Pulse 64   Resp 12   Wt 225 lb 6.4 oz (102.2 kg)   SpO2 98%   BMI 36.38 kg/m²     Physical Exam  Constitutional:       Appearance: She is well-developed. Eyes:      General: No scleral icterus. Conjunctiva/sclera: Conjunctivae normal.   Neck:      Thyroid: No thyromegaly. Trachea: No tracheal deviation. Cardiovascular:      Rate and Rhythm: Normal rate and regular rhythm. Heart sounds: No murmur heard. No friction rub. No gallop. Pulmonary:      Effort: No respiratory distress. Breath sounds: No wheezing or rales. Abdominal:      General: Bowel sounds are normal. There is no distension. Palpations: Abdomen is soft. There is no hepatomegaly or mass. Tenderness: There is no abdominal tenderness. There is no guarding or rebound. Musculoskeletal:      Cervical back: Neck supple. Lymphadenopathy:      Cervical: No cervical adenopathy. Skin:     Nails: There is no clubbing. Neurological:      Mental Status: She is alert and oriented to person, place, and time. Psychiatric:         Behavior: Behavior normal.         Judgment: Judgment normal.       1cm freely mobile nontender subQ nodule left side of scalp      ASSESSMENT:    1. Encounter for preventive care    2. Encounter for screening mammogram for breast cancer    3.  Hypothyroidism due to acquired atrophy of thyroid    4. Primary hypertension    5. Recurrent major depressive disorder, in partial remission ([facility] [address]    6. IFG (impaired fasting glucose)    7. Vitamin D deficiency        PLAN:    Sowmya Gaming was seen today for annual exam, mole, nodule, otalgia and other. Diagnoses and all orders for this visit:    Encounter for preventive care - BP at goal; encourage exercise; check labs; check mammo; declines covid vaccine; up to date on GYN care  -     Comprehensive Metabolic Panel; Future  -     Lipid Panel; Future  -     CBC with Auto Differential; Future  -     TSH; Future  -     T4, Free; Future  -     T3, Free; Future  -     Thyroid Peroxidase Antibody; Future  -     JUAN A DIGITAL SCREEN W OR WO CAD BILATERAL; Future  -     Vitamin D 25 Hydroxy; Future  -     Hemoglobin A1C; Future    Encounter for screening mammogram for breast cancer  -     JUAN A DIGITAL SCREEN W OR WO CAD BILATERAL; Future    Hypothyroidism due to acquired atrophy of thyroid - pt interested in dessicated thyroid. Discussed issues with this Tx, but if pt would like to try this we can though it is more cumbersome  -     TSH; Future  -     T4, Free; Future  -     T3, Free; Future  -     Thyroid Peroxidase Antibody; Future    Primary hypertension - at goal; no change   -     Comprehensive Metabolic Panel; Future  -     Lipid Panel; Future  -     CBC with Auto Differential; Future    Recurrent major depressive disorder, in partial remission ([facility] [address] - may benefit from change or addition of med; will f/u in 2 weeks    IFG (impaired fasting glucose) = check a1c  -     Hemoglobin A1C; Future    Vitamin D deficiency  -     Vitamin D 25 Hydroxy;  Future

## 2022-04-22 LAB
A/G RATIO: 1.9 (ref 1.2–2.2)
ALBUMIN SERPL-MCNC: 4 G/DL (ref 3.8–4.9)
ALP BLD-CCNC: 73 IU/L (ref 44–121)
ALT SERPL-CCNC: 12 IU/L (ref 0–32)
AMBIGUOUS ABBREVIATION: NORMAL
AMBIGUOUS ABBREVIATION: NORMAL
AST SERPL-CCNC: 13 IU/L (ref 0–40)
BASOPHILS ABSOLUTE: 0 X10E3/UL (ref 0–0.2)
BASOPHILS RELATIVE PERCENT: 1 %
BILIRUB SERPL-MCNC: 0.2 MG/DL (ref 0–1.2)
BUN / CREAT RATIO: 22 (ref 9–23)
BUN BLDV-MCNC: 19 MG/DL (ref 6–24)
CALCIUM SERPL-MCNC: 9.4 MG/DL (ref 8.7–10.2)
CHLORIDE BLD-SCNC: 101 MMOL/L (ref 96–106)
CHOLESTEROL, TOTAL: 232 MG/DL (ref 100–199)
CO2: 23 MMOL/L (ref 20–29)
COMMENT: ABNORMAL
CREAT SERPL-MCNC: 0.88 MG/DL (ref 0.57–1)
EOSINOPHILS ABSOLUTE: 0.2 X10E3/UL (ref 0–0.4)
EOSINOPHILS RELATIVE PERCENT: 3 %
ERYTHROCYTES, NUCLEATED/100 LEU: NORMAL
ESTIMATED GLOMERULAR FILTRATION RATE CREATININE EQUATION: 78 ML/MIN/1.73
GLOBULIN: 2.1 G/DL (ref 1.5–4.5)
GLUCOSE BLD-MCNC: 104 MG/DL (ref 65–99)
HBA1C MFR BLD: 5.6 % (ref 4.8–5.6)
HCT VFR BLD CALC: 43.2 % (ref 34–46.6)
HDLC SERPL-MCNC: 65 MG/DL
HEMOGLOBIN: 14.4 G/DL (ref 11.1–15.9)
IMMATURE CELLS ABSOLUTE COUNT: NORMAL
IMMATURE GRANS (ABS): 0 X10E3/UL (ref 0–0.1)
IMMATURE GRANULOCYTES: 0 %
LDL CHOLESTEROL CALCULATED: 137 MG/DL (ref 0–99)
LYMPHOCYTES ABSOLUTE: 2.1 X10E3/UL (ref 0.7–3.1)
LYMPHOCYTES RELATIVE PERCENT: 29 %
MCH RBC QN AUTO: 31.2 PG (ref 26.6–33)
MCHC RBC AUTO-ENTMCNC: 33.3 G/DL (ref 31.5–35.7)
MCV RBC AUTO: 94 FL (ref 79–97)
MONOCYTES ABSOLUTE: 0.5 X10E3/UL (ref 0.1–0.9)
MONOCYTES RELATIVE PERCENT: 8 %
MORPHOLOGY: NORMAL
NEUTROPHILS ABSOLUTE: 4.1 X10E3/UL (ref 1.4–7)
PDW BLD-RTO: 12.3 % (ref 11.7–15.4)
PLATELET # BLD: 308 X10E3/UL (ref 150–450)
POTASSIUM SERPL-SCNC: 4.6 MMOL/L (ref 3.5–5.2)
RBC # BLD: 4.61 X10E6/UL (ref 3.77–5.28)
SEGMENTED NEUTROPHILS RELATIVE PERCENT: 59 %
SODIUM BLD-SCNC: 140 MMOL/L (ref 134–144)
T3 FREE: 2.9 PG/ML (ref 2–4.4)
T4 FREE: 1.04 NG/DL (ref 0.82–1.77)
THYROID PEROXIDASE ANTIBODIES: <8 IU/ML (ref 0–34)
TOTAL PROTEIN: 6.1 G/DL (ref 6–8.5)
TRIGL SERPL-MCNC: 168 MG/DL (ref 0–149)
TSH SERPL DL<=0.05 MIU/L-ACNC: 2.95 UIU/ML (ref 0.45–4.5)
VITAMIN D 25-HYDROXY: 39.2 NG/ML (ref 30–100)
VLDLC SERPL CALC-MCNC: 30 MG/DL (ref 5–40)
WBC # BLD: 7 X10E3/UL (ref 3.4–10.8)

## 2022-05-05 ENCOUNTER — OFFICE VISIT (OUTPATIENT)
Dept: INTERNAL MEDICINE CLINIC | Age: 57
End: 2022-05-05
Payer: COMMERCIAL

## 2022-05-05 VITALS
HEART RATE: 57 BPM | RESPIRATION RATE: 14 BRPM | SYSTOLIC BLOOD PRESSURE: 124 MMHG | DIASTOLIC BLOOD PRESSURE: 74 MMHG | BODY MASS INDEX: 35.93 KG/M2 | OXYGEN SATURATION: 98 % | WEIGHT: 222.6 LBS

## 2022-05-05 DIAGNOSIS — F33.41 RECURRENT MAJOR DEPRESSIVE DISORDER, IN PARTIAL REMISSION (HCC): ICD-10-CM

## 2022-05-05 DIAGNOSIS — E03.4 HYPOTHYROIDISM DUE TO ACQUIRED ATROPHY OF THYROID: Primary | ICD-10-CM

## 2022-05-05 PROCEDURE — 99213 OFFICE O/P EST LOW 20 MIN: CPT | Performed by: INTERNAL MEDICINE

## 2022-05-05 RX ORDER — BUPROPION HYDROCHLORIDE 150 MG/1
150 TABLET ORAL EVERY MORNING
Qty: 90 TABLET | Refills: 3 | Status: SHIPPED | OUTPATIENT
Start: 2022-05-05 | End: 2022-06-30

## 2022-05-05 RX ORDER — ESCITALOPRAM OXALATE 20 MG/1
TABLET ORAL
Qty: 90 TABLET | Refills: 3
Start: 2022-05-05 | End: 2022-06-30

## 2022-06-30 ENCOUNTER — OFFICE VISIT (OUTPATIENT)
Dept: INTERNAL MEDICINE CLINIC | Age: 57
End: 2022-06-30
Payer: COMMERCIAL

## 2022-06-30 VITALS
BODY MASS INDEX: 35.51 KG/M2 | OXYGEN SATURATION: 96 % | SYSTOLIC BLOOD PRESSURE: 120 MMHG | DIASTOLIC BLOOD PRESSURE: 74 MMHG | RESPIRATION RATE: 18 BRPM | HEART RATE: 64 BPM | WEIGHT: 220 LBS

## 2022-06-30 DIAGNOSIS — Z12.11 COLON CANCER SCREENING: ICD-10-CM

## 2022-06-30 DIAGNOSIS — F33.41 RECURRENT MAJOR DEPRESSIVE DISORDER, IN PARTIAL REMISSION (HCC): Primary | ICD-10-CM

## 2022-06-30 PROCEDURE — 99213 OFFICE O/P EST LOW 20 MIN: CPT | Performed by: INTERNAL MEDICINE

## 2022-06-30 RX ORDER — ESCITALOPRAM OXALATE 5 MG/1
5 TABLET ORAL DAILY
Qty: 90 TABLET | Refills: 3 | Status: SHIPPED | OUTPATIENT
Start: 2022-06-30

## 2022-06-30 RX ORDER — BUPROPION HYDROCHLORIDE 300 MG/1
300 TABLET ORAL EVERY MORNING
Qty: 90 TABLET | Refills: 3 | Status: SHIPPED | OUTPATIENT
Start: 2022-06-30 | End: 2022-07-26

## 2022-06-30 NOTE — PROGRESS NOTES
Dory Stone  1965  06/30/22    SUBJECTIVE:    Initially stopped the lexapro was difficult, but soon after her depression improved with the wellbutrin. She has noticed more bruising since starting the wellbutrin. She has not noticed any change in energy. She had been getting exercise more regularly then her schedule was disrupted but she is planning on resuming. OBJECTIVE:    /74   Pulse 64   Resp 18   Wt 220 lb (99.8 kg)   SpO2 96%   BMI 35.51 kg/m²     Physical Exam    ASSESSMENT:    1. Recurrent major depressive disorder, in partial remission ([facility] [address]    2. Colon cancer screening        PLAN:    Eliana Alicea was seen today for other. Diagnoses and all orders for this visit:    Recurrent major depressive disorder, in partial remission ([facility] [address] - will increase the wellbutrin and decrease lexapro further. Benefit for weight may take a while to manifest. Encourage regular exercise  -     buPROPion (WELLBUTRIN XL) 300 MG extended release tablet; Take 1 tablet by mouth every morning  -     escitalopram (LEXAPRO) 5 MG tablet;  Take 1 tablet by mouth daily    Colon cancer screening  -     Amb External Referral To Gastroenterology

## 2022-07-08 DIAGNOSIS — Z12.31 ENCOUNTER FOR SCREENING MAMMOGRAM FOR BREAST CANCER: ICD-10-CM

## 2022-07-08 DIAGNOSIS — Z00.00 ENCOUNTER FOR PREVENTIVE CARE: ICD-10-CM

## 2022-07-22 DIAGNOSIS — J01.00 ACUTE NON-RECURRENT MAXILLARY SINUSITIS: ICD-10-CM

## 2022-07-22 RX ORDER — FLUTICASONE PROPIONATE 50 MCG
SPRAY, SUSPENSION (ML) NASAL
Qty: 16 G | Refills: 5 | Status: SHIPPED | OUTPATIENT
Start: 2022-07-22

## 2022-07-25 ENCOUNTER — PATIENT MESSAGE (OUTPATIENT)
Dept: INTERNAL MEDICINE CLINIC | Age: 57
End: 2022-07-25

## 2022-07-26 RX ORDER — BUPROPION HYDROCHLORIDE 150 MG/1
150 TABLET ORAL EVERY MORNING
Qty: 90 TABLET | Refills: 3 | Status: SHIPPED | OUTPATIENT
Start: 2022-07-26

## 2022-07-26 NOTE — TELEPHONE ENCOUNTER
From: Leland Orellana  To: Dr. Lily Ontiveros: 7/25/2022 5:42 PM EDT  Subject: current dose of antidepressants    Dr. Moni Hutchins-  Emotionally on the 5mg Lexapro and 300mg Wellbutrin I am feeling good and I have dropped to 215lbs. However, I am really tired all of the time. It is difficult to get out of bed in the morning. I am back to feeling like I need a nap mid-day. And my level of concentration during the day is drastically lowered, making my work day very difficult. With your approval, I'd like to keep the 5mg Lexapro where it is and drop the Wellbutrin back to 150mg and see if my tiredness & concentration improves. Ultimately, I would really like to be able to eliminate all antidepressants, as scary as that sounds, since I have been on them since I was 20 or 21. Please let me know what you think. Thank you.   Donavan Alfaro

## 2022-10-26 ENCOUNTER — OFFICE VISIT (OUTPATIENT)
Dept: INTERNAL MEDICINE CLINIC | Age: 57
End: 2022-10-26
Payer: COMMERCIAL

## 2022-10-26 VITALS
WEIGHT: 219 LBS | HEART RATE: 63 BPM | DIASTOLIC BLOOD PRESSURE: 66 MMHG | OXYGEN SATURATION: 97 % | BODY MASS INDEX: 35.35 KG/M2 | SYSTOLIC BLOOD PRESSURE: 128 MMHG | RESPIRATION RATE: 18 BRPM

## 2022-10-26 DIAGNOSIS — R42 VERTIGO: Primary | ICD-10-CM

## 2022-10-26 PROCEDURE — 3078F DIAST BP <80 MM HG: CPT | Performed by: INTERNAL MEDICINE

## 2022-10-26 PROCEDURE — 3074F SYST BP LT 130 MM HG: CPT | Performed by: INTERNAL MEDICINE

## 2022-10-26 PROCEDURE — 99214 OFFICE O/P EST MOD 30 MIN: CPT | Performed by: INTERNAL MEDICINE

## 2022-10-26 RX ORDER — AMOXICILLIN 500 MG/1
500 CAPSULE ORAL 3 TIMES DAILY
Qty: 30 CAPSULE | Refills: 0 | Status: SHIPPED | OUTPATIENT
Start: 2022-10-26 | End: 2022-11-05

## 2022-10-26 NOTE — PROGRESS NOTES
Phyllis Montes  1965  10/26/22    SUBJECTIVE:      2 weeks ago pt developed nasal congestion, yellow rhinorrhea, itching ears, bilat ear pain. Yesterday she rolled over in bed in each direction and developed vertigo - room was spinning, she had nausea without vomiting. This occurred also when she got out of bed. The vertigo lasted ~30 seconds. She was unsteady the rest of the day but was moving gingerly. She denies any N/W. She has had vertigo ~7 yrs ago. OBJECTIVE:    /66   Pulse 63   Resp 18   Wt 219 lb (99.3 kg)   SpO2 97%   BMI 35.35 kg/m²     Physical Exam  Constitutional:       Appearance: She is well-developed. HENT:      Right Ear: External ear normal.      Left Ear: External ear normal.      Nose: Nose normal.      Mouth/Throat:      Pharynx: No oropharyngeal exudate. Eyes:      Conjunctiva/sclera: Conjunctivae normal.   Cardiovascular:      Rate and Rhythm: Normal rate and regular rhythm. Heart sounds: Normal heart sounds. Pulmonary:      Effort: Pulmonary effort is normal.      Breath sounds: Normal breath sounds. Lymphadenopathy:      Cervical: No cervical adenopathy. Neurological:      Mental Status: She is alert. ASSESSMENT:    1. Vertigo        PLAN:    Kimberly Pedraza was seen today for dizziness. Diagnoses and all orders for this visit:    Vertigo - could be BPPV vs sinusitis with transmission of pressure to the middle and inner ears. Will Tx with amoc, zyrtec D, and flonase. If not improving pt will call and I will give prednisone and refer for PT. No suggestion of cerebellar disease  -     amoxicillin (AMOXIL) 500 MG capsule;  Take 1 capsule by mouth 3 times daily for 10 days

## 2022-11-28 ENCOUNTER — OFFICE VISIT (OUTPATIENT)
Dept: OBGYN | Age: 57
End: 2022-11-28
Payer: COMMERCIAL

## 2022-11-28 VITALS
WEIGHT: 218 LBS | DIASTOLIC BLOOD PRESSURE: 82 MMHG | BODY MASS INDEX: 35.03 KG/M2 | HEIGHT: 66 IN | SYSTOLIC BLOOD PRESSURE: 122 MMHG

## 2022-11-28 DIAGNOSIS — Z01.419 ENCOUNTER FOR WELL WOMAN EXAM WITH ROUTINE GYNECOLOGICAL EXAM: Primary | ICD-10-CM

## 2022-11-28 PROCEDURE — 3074F SYST BP LT 130 MM HG: CPT | Performed by: OBSTETRICS & GYNECOLOGY

## 2022-11-28 PROCEDURE — 3078F DIAST BP <80 MM HG: CPT | Performed by: OBSTETRICS & GYNECOLOGY

## 2022-11-28 PROCEDURE — 99396 PREV VISIT EST AGE 40-64: CPT | Performed by: OBSTETRICS & GYNECOLOGY

## 2022-11-28 SDOH — ECONOMIC STABILITY: FOOD INSECURITY: WITHIN THE PAST 12 MONTHS, YOU WORRIED THAT YOUR FOOD WOULD RUN OUT BEFORE YOU GOT MONEY TO BUY MORE.: NEVER TRUE

## 2022-11-28 SDOH — ECONOMIC STABILITY: INCOME INSECURITY: HOW HARD IS IT FOR YOU TO PAY FOR THE VERY BASICS LIKE FOOD, HOUSING, MEDICAL CARE, AND HEATING?: NOT HARD AT ALL

## 2022-11-28 SDOH — ECONOMIC STABILITY: TRANSPORTATION INSECURITY
IN THE PAST 12 MONTHS, HAS LACK OF TRANSPORTATION KEPT YOU FROM MEETINGS, WORK, OR FROM GETTING THINGS NEEDED FOR DAILY LIVING?: NO

## 2022-11-28 SDOH — ECONOMIC STABILITY: INCOME INSECURITY: IN THE LAST 12 MONTHS, WAS THERE A TIME WHEN YOU WERE NOT ABLE TO PAY THE MORTGAGE OR RENT ON TIME?: NO

## 2022-11-28 SDOH — ECONOMIC STABILITY: FOOD INSECURITY: WITHIN THE PAST 12 MONTHS, THE FOOD YOU BOUGHT JUST DIDN'T LAST AND YOU DIDN'T HAVE MONEY TO GET MORE.: NEVER TRUE

## 2022-11-28 SDOH — ECONOMIC STABILITY: TRANSPORTATION INSECURITY
IN THE PAST 12 MONTHS, HAS THE LACK OF TRANSPORTATION KEPT YOU FROM MEDICAL APPOINTMENTS OR FROM GETTING MEDICATIONS?: NO

## 2022-11-28 SDOH — ECONOMIC STABILITY: HOUSING INSECURITY
IN THE LAST 12 MONTHS, WAS THERE A TIME WHEN YOU DID NOT HAVE A STEADY PLACE TO SLEEP OR SLEPT IN A SHELTER (INCLUDING NOW)?: NO

## 2022-11-28 NOTE — PROGRESS NOTES
11/28/22    Ari Argueta  1965    Chief Complaint   Patient presents with    Gynecologic Exam     Pt here for annual, had hyster/bso, is sexually active, hrt-premarin, [id number], mammo/breast u/s 7/22-BI-RAD 2 benign, dexa- 2021-neg, [id number]. Requesting refill on premarin. Ari Argueta is a 55 y.o. female who presents today for evaluation of annual examination.     Past Medical History:   Diagnosis Date    Depression     Hypertension     \"on medication for 8 yrs \"    Irregular periods 12/2017    \"since April 2017- lots of clotting- heavy flow and cramping\"    Thyroid disease        Past Surgical History:   Procedure Laterality Date    APPENDECTOMY  2009    CHOLECYSTECTOMY  12/01/2013    COLONOSCOPY  06/14/2017    Diverticulosis, 1 polyp    FOOT SURGERY Right 04/2019    bone spur    HYSTERECTOMY (CERVIX STATUS UNKNOWN)      HYSTEROSCOPY  12/19/2017    D & C    OTHER SURGICAL HISTORY      IUD inserted 12/2013    SHOULDER SURGERY Right 2010    KELLY AND BSO (CERVIX REMOVED)  02/2017       Social History     Tobacco Use    Smoking status: Never    Smokeless tobacco: Never   Vaping Use    Vaping Use: Never used   Substance Use Topics    Alcohol use: No     Comment: \"quit drinking 8 yrs ago- use to drink 3-4 times per week- average 3 beers\"    Drug use: No       Family History   Problem Relation Age of Onset    Hypertension Mother     Migraines Mother     Thyroid Disease Mother     Depression Mother     Asthma Mother     Migraines Sister     Hypertension Father     Thyroid Disease Father     Other Father         polycythemia    Thyroid Disease Brother        Current Outpatient Medications   Medication Sig Dispense Refill    estrogens, conjugated, (PREMARIN) 0.625 MG tablet Take 1 tablet by mouth daily 30 tablet 11    buPROPion (WELLBUTRIN XL) 150 MG extended release tablet Take 1 tablet by mouth every morning 90 tablet 3    fluticasone (FLONASE) 50 MCG/ACT nasal spray SHAKE LIQUID AND USE 2 SPRAYS IN EACH NOSTRIL DAILY 16 g 5    escitalopram (LEXAPRO) 5 MG tablet Take 1 tablet by mouth daily 90 tablet 3    lisinopril (PRINIVIL;ZESTRIL) 20 MG tablet TAKE 1 TABLET DAILY 90 tablet 3    levothyroxine (SYNTHROID) 175 MCG tablet TAKE 1 TABLET DAILY 90 tablet 3    fluocinonide (LIDEX) 0.05 % cream Apply topically 2 times daily. 30 g 1    diclofenac sodium 1 % GEL Apply 2 g topically 4 times daily Apply to affect heal on area on the right heal four times daily 1 Tube 5    loratadine (CLARITIN) 10 MG tablet Take 10 mg by mouth daily      multivitamin (THERAGRAN) per tablet Take 1 tablet by mouth daily. UNABLE TO FIND Please administer two SHINGRIX vaccines 2-6 months apart (Patient not taking: Reported on 10/26/2022) 2 Units 0     No current facility-administered medications for this visit. Allergies   Allergen Reactions    Doxycycline      vertigo           Immunization History   Administered Date(s) Administered    Tdap (Boostrix, Adacel) 2019       Review of Systems  All other systems reviewed and are negative    /82   Ht 5' 6\" (1.676 m)   Wt 218 lb (98.9 kg)   LMP 2017   BMI 35.19 kg/m²     Physical Exam  Nursing note reviewed. Exam conducted with a chaperone present. HENT:      Head: Normocephalic. Nose: Nose normal.   Eyes:      Extraocular Movements: Extraocular movements intact. Pulmonary:      Effort: Pulmonary effort is normal.   Abdominal:      General: Abdomen is flat. Palpations: Abdomen is soft. Hernia: There is no hernia in the left inguinal area or right inguinal area. Genitourinary:     General: Normal vulva. Exam position: Lithotomy position. Pubic Area: No rash or pubic lice. Labia:         Right: No rash, tenderness, lesion or injury. Left: No rash, tenderness, lesion or injury. Urethra: No prolapse, urethral pain, urethral swelling or urethral lesion.       Vagina: Normal.      Cervix: Normal. Uterus: Normal.       Adnexa: Right adnexa normal and left adnexa normal.      Rectum: Normal.   Musculoskeletal:      Cervical back: Normal range of motion. Lymphadenopathy:      Lower Body: No right inguinal adenopathy. No left inguinal adenopathy. Skin:     General: Skin is warm. Neurological:      Mental Status: She is alert. Uterus and cervix surgically absent  No results found for this visit on 11/28/22. ASSESSMENT AND PLAN   Diagnosis Orders   1. Encounter for well woman exam with routine gynecological exam        Mammogram done, self breast examinations. No Pap smear today secondary to hysterectomy. Return for Annual examination.     Isidro Villareal MD

## 2023-01-19 DIAGNOSIS — E03.4 HYPOTHYROIDISM DUE TO ACQUIRED ATROPHY OF THYROID: ICD-10-CM

## 2023-01-19 DIAGNOSIS — I10 ESSENTIAL HYPERTENSION: ICD-10-CM

## 2023-01-20 DIAGNOSIS — R21 RASH AND NONSPECIFIC SKIN ERUPTION: Primary | ICD-10-CM

## 2023-01-20 DIAGNOSIS — E03.4 HYPOTHYROIDISM DUE TO ACQUIRED ATROPHY OF THYROID: ICD-10-CM

## 2023-01-20 DIAGNOSIS — I10 ESSENTIAL HYPERTENSION: ICD-10-CM

## 2023-01-20 RX ORDER — FLUCONAZOLE 150 MG/1
150 TABLET ORAL SEE ADMIN INSTRUCTIONS
Qty: 5 TABLET | Refills: 0 | Status: SHIPPED | OUTPATIENT
Start: 2023-01-20

## 2023-01-20 RX ORDER — LEVOTHYROXINE SODIUM 175 UG/1
TABLET ORAL
Qty: 90 TABLET | Refills: 3 | Status: SHIPPED | OUTPATIENT
Start: 2023-01-20 | End: 2023-01-20 | Stop reason: SDUPTHER

## 2023-01-20 RX ORDER — LISINOPRIL 20 MG/1
TABLET ORAL
Qty: 90 TABLET | Refills: 3 | Status: SHIPPED | OUTPATIENT
Start: 2023-01-20

## 2023-01-20 RX ORDER — LISINOPRIL 20 MG/1
TABLET ORAL
Qty: 90 TABLET | Refills: 3 | Status: SHIPPED | OUTPATIENT
Start: 2023-01-20 | End: 2023-01-20 | Stop reason: SDUPTHER

## 2023-01-20 RX ORDER — LEVOTHYROXINE SODIUM 175 UG/1
TABLET ORAL
Qty: 90 TABLET | Refills: 3 | Status: SHIPPED | OUTPATIENT
Start: 2023-01-20

## 2023-01-24 DIAGNOSIS — R21 RASH AND NONSPECIFIC SKIN ERUPTION: ICD-10-CM

## 2023-01-24 RX ORDER — FLUCONAZOLE 150 MG/1
150 TABLET ORAL SEE ADMIN INSTRUCTIONS
Qty: 5 TABLET | Refills: 0 | Status: SHIPPED | OUTPATIENT
Start: 2023-01-24

## 2023-01-24 NOTE — TELEPHONE ENCOUNTER
Can you please resend Diflucan for pt. It was sent to the wrong pharmacy. Pt needs it sent to [facility] on [facility]. Rx pending.

## 2023-01-30 DIAGNOSIS — E03.4 HYPOTHYROIDISM DUE TO ACQUIRED ATROPHY OF THYROID: ICD-10-CM

## 2023-01-30 DIAGNOSIS — I10 ESSENTIAL HYPERTENSION: ICD-10-CM

## 2023-01-30 RX ORDER — LISINOPRIL 20 MG/1
TABLET ORAL
Qty: 90 TABLET | Refills: 3 | Status: SHIPPED | OUTPATIENT
Start: 2023-01-30

## 2023-01-30 RX ORDER — LEVOTHYROXINE SODIUM 175 UG/1
TABLET ORAL
Qty: 90 TABLET | Refills: 3 | Status: SHIPPED | OUTPATIENT
Start: 2023-01-30

## 2023-02-18 DIAGNOSIS — F33.41 RECURRENT MAJOR DEPRESSIVE DISORDER, IN PARTIAL REMISSION (HCC): ICD-10-CM

## 2023-02-21 RX ORDER — ESCITALOPRAM OXALATE 5 MG/1
5 TABLET ORAL DAILY
Qty: 90 TABLET | Refills: 3 | Status: SHIPPED | OUTPATIENT
Start: 2023-02-21

## 2023-02-21 RX ORDER — BUPROPION HYDROCHLORIDE 150 MG/1
150 TABLET ORAL EVERY MORNING
Qty: 90 TABLET | Refills: 3 | Status: SHIPPED | OUTPATIENT
Start: 2023-02-21

## 2023-03-24 ENCOUNTER — OFFICE VISIT (OUTPATIENT)
Dept: INTERNAL MEDICINE CLINIC | Age: 58
End: 2023-03-24
Payer: COMMERCIAL

## 2023-03-24 VITALS
WEIGHT: 222 LBS | HEIGHT: 66 IN | RESPIRATION RATE: 20 BRPM | DIASTOLIC BLOOD PRESSURE: 72 MMHG | SYSTOLIC BLOOD PRESSURE: 118 MMHG | HEART RATE: 61 BPM | OXYGEN SATURATION: 99 % | BODY MASS INDEX: 35.68 KG/M2

## 2023-03-24 DIAGNOSIS — J01.00 ACUTE NON-RECURRENT MAXILLARY SINUSITIS: Primary | ICD-10-CM

## 2023-03-24 PROCEDURE — 99213 OFFICE O/P EST LOW 20 MIN: CPT | Performed by: NURSE PRACTITIONER

## 2023-03-24 PROCEDURE — 3078F DIAST BP <80 MM HG: CPT | Performed by: NURSE PRACTITIONER

## 2023-03-24 PROCEDURE — 3074F SYST BP LT 130 MM HG: CPT | Performed by: NURSE PRACTITIONER

## 2023-03-24 RX ORDER — AMOXICILLIN AND CLAVULANATE POTASSIUM 875; 125 MG/1; MG/1
1 TABLET, FILM COATED ORAL 2 TIMES DAILY
Qty: 14 TABLET | Refills: 0 | Status: SHIPPED | OUTPATIENT
Start: 2023-03-24 | End: 2023-03-31

## 2023-03-24 RX ORDER — PREDNISONE 10 MG/1
TABLET ORAL
Qty: 20 TABLET | Refills: 0 | Status: SHIPPED | OUTPATIENT
Start: 2023-03-24

## 2023-03-24 ASSESSMENT — PATIENT HEALTH QUESTIONNAIRE - PHQ9
3. TROUBLE FALLING OR STAYING ASLEEP: 0
7. TROUBLE CONCENTRATING ON THINGS, SUCH AS READING THE NEWSPAPER OR WATCHING TELEVISION: 0
5. POOR APPETITE OR OVEREATING: 0
SUM OF ALL RESPONSES TO PHQ9 QUESTIONS 1 & 2: 0
10. IF YOU CHECKED OFF ANY PROBLEMS, HOW DIFFICULT HAVE THESE PROBLEMS MADE IT FOR YOU TO DO YOUR WORK, TAKE CARE OF THINGS AT HOME, OR GET ALONG WITH OTHER PEOPLE: 0
8. MOVING OR SPEAKING SO SLOWLY THAT OTHER PEOPLE COULD HAVE NOTICED. OR THE OPPOSITE, BEING SO FIGETY OR RESTLESS THAT YOU HAVE BEEN MOVING AROUND A LOT MORE THAN USUAL: 0
DEPRESSION UNABLE TO ASSESS: FUNCTIONAL CAPACITY MOTIVATION LIMITS ACCURACY
2. FEELING DOWN, DEPRESSED OR HOPELESS: 0
SUM OF ALL RESPONSES TO PHQ QUESTIONS 1-9: 0
4. FEELING TIRED OR HAVING LITTLE ENERGY: 0
SUM OF ALL RESPONSES TO PHQ QUESTIONS 1-9: 0
1. LITTLE INTEREST OR PLEASURE IN DOING THINGS: 0
SUM OF ALL RESPONSES TO PHQ QUESTIONS 1-9: 0
6. FEELING BAD ABOUT YOURSELF - OR THAT YOU ARE A FAILURE OR HAVE LET YOURSELF OR YOUR FAMILY DOWN: 0
SUM OF ALL RESPONSES TO PHQ QUESTIONS 1-9: 0
9. THOUGHTS THAT YOU WOULD BE BETTER OFF DEAD, OR OF HURTING YOURSELF: 0

## 2023-03-24 ASSESSMENT — ENCOUNTER SYMPTOMS
SINUS PAIN: 1
ABDOMINAL PAIN: 0
SINUS PRESSURE: 1
APNEA: 0
NAUSEA: 0
COUGH: 1
RHINORRHEA: 1
CHEST TIGHTNESS: 0
SHORTNESS OF BREATH: 0
DIARRHEA: 0
COLOR CHANGE: 0
SORE THROAT: 1
VOMITING: 0

## 2023-03-24 NOTE — PROGRESS NOTES
Thanh Begum  1965  03/24/23    Chief Complaint   Patient presents with    Pharyngitis     Post nasal drip, sinus pressure, dry cough, hoarseness in voice sx started Monday        SUBJECTIVE:      Mrs Josefa Huertas is a current patient of Dr Ratna Landry who presents to the office this afternoon for c/o worsening sinus pain/pressure, PND, persistent dry (but non-productive cough) and pharyngitis over the last week. She has been taking Dayquil, Nyquil, Mucinex with modest benefit. Review of Systems   Constitutional:  Negative for activity change, appetite change, fatigue and fever. HENT:  Positive for congestion, postnasal drip, rhinorrhea, sinus pressure, sinus pain and sore throat. Negative for nosebleeds. Respiratory:  Positive for cough. Negative for apnea, chest tightness and shortness of breath. Cardiovascular:  Negative for chest pain and palpitations. Gastrointestinal:  Negative for abdominal pain, diarrhea, nausea and vomiting. Genitourinary:  Negative for difficulty urinating, flank pain and hematuria. Musculoskeletal:  Negative for arthralgias, joint swelling and myalgias. Skin:  Negative for color change and rash. Neurological:  Negative for dizziness, light-headedness and headaches. Psychiatric/Behavioral: Negative. Negative for behavioral problems. OBJECTIVE:    /72   Pulse 61   Resp 20   Ht 5' 6\" (1.676 m)   Wt 222 lb (100.7 kg)   LMP 12/04/2017   SpO2 99%   BMI 35.83 kg/m²     Physical Exam  Constitutional:       Appearance: She is well-developed. HENT:      Right Ear: External ear normal.      Left Ear: External ear normal.      Nose: Mucosal edema and rhinorrhea present. Eyes:      Pupils: Pupils are equal, round, and reactive to light. Cardiovascular:      Rate and Rhythm: Normal rate and regular rhythm. Pulmonary:      Effort: Pulmonary effort is normal. No respiratory distress. Breath sounds: Normal breath sounds. No wheezing.    Abdominal:

## 2023-04-04 RX ORDER — ESTRADIOL 1 MG/1
1 TABLET ORAL DAILY
Qty: 90 TABLET | Refills: 3 | Status: SHIPPED | OUTPATIENT
Start: 2023-04-04

## 2023-04-26 ENCOUNTER — OFFICE VISIT (OUTPATIENT)
Dept: INTERNAL MEDICINE CLINIC | Age: 58
End: 2023-04-26
Payer: COMMERCIAL

## 2023-04-26 VITALS
WEIGHT: 222 LBS | SYSTOLIC BLOOD PRESSURE: 130 MMHG | HEART RATE: 79 BPM | OXYGEN SATURATION: 95 % | BODY MASS INDEX: 35.83 KG/M2 | RESPIRATION RATE: 18 BRPM | DIASTOLIC BLOOD PRESSURE: 88 MMHG

## 2023-04-26 DIAGNOSIS — I10 PRIMARY HYPERTENSION: Primary | ICD-10-CM

## 2023-04-26 DIAGNOSIS — F33.41 RECURRENT MAJOR DEPRESSIVE DISORDER, IN PARTIAL REMISSION (HCC): ICD-10-CM

## 2023-04-26 DIAGNOSIS — E55.9 VITAMIN D DEFICIENCY: ICD-10-CM

## 2023-04-26 DIAGNOSIS — R73.01 IFG (IMPAIRED FASTING GLUCOSE): ICD-10-CM

## 2023-04-26 DIAGNOSIS — E03.4 HYPOTHYROIDISM DUE TO ACQUIRED ATROPHY OF THYROID: ICD-10-CM

## 2023-04-26 PROBLEM — E66.01 SEVERE OBESITY (BMI 35.0-39.9) WITH COMORBIDITY (HCC): Status: ACTIVE | Noted: 2023-04-26

## 2023-04-26 PROCEDURE — 99214 OFFICE O/P EST MOD 30 MIN: CPT | Performed by: INTERNAL MEDICINE

## 2023-04-26 PROCEDURE — 3079F DIAST BP 80-89 MM HG: CPT | Performed by: INTERNAL MEDICINE

## 2023-04-26 PROCEDURE — 3075F SYST BP GE 130 - 139MM HG: CPT | Performed by: INTERNAL MEDICINE

## 2023-04-26 NOTE — PROGRESS NOTES
Shanika Courtneyen  1965  04/26/23    SUBJECTIVE:      Pt with one day of a sore throat, PND, nasal congestion, itching ears, dry cough, mild sinus pain. She denies F/C, SOB, wheezing. The patient is taking hypertensive medications compliantly without side effects. Denies chest pain, dyspnea, edema, or TIA's. Blood pressure has been 135/90. .     Depression is doing OK with the wellbutrin and lexapro. Pt continues on synthroid for hypothyroidism     OBJECTIVE:    /88   Pulse 79   Resp 18   Wt 222 lb (100.7 kg)   LMP 12/04/2017   SpO2 95%   BMI 35.83 kg/m²     Physical Exam  Constitutional:       Appearance: She is well-developed. HENT:      Right Ear: External ear normal.      Left Ear: External ear normal.      Nose: Nose normal.      Mouth/Throat:      Pharynx: No oropharyngeal exudate. Eyes:      General: No scleral icterus. Conjunctiva/sclera: Conjunctivae normal.   Neck:      Thyroid: No thyromegaly. Trachea: No tracheal deviation. Cardiovascular:      Rate and Rhythm: Normal rate and regular rhythm. Heart sounds: Normal heart sounds. No murmur heard. No friction rub. No gallop. Pulmonary:      Effort: Pulmonary effort is normal. No respiratory distress. Breath sounds: Normal breath sounds. No wheezing or rales. Abdominal:      General: Bowel sounds are normal. There is no distension. Palpations: Abdomen is soft. There is no hepatomegaly or mass. Tenderness: There is no abdominal tenderness. There is no guarding or rebound. Musculoskeletal:      Cervical back: Neck supple. Lymphadenopathy:      Cervical: No cervical adenopathy. Skin:     Nails: There is no clubbing. Neurological:      Mental Status: She is alert and oriented to person, place, and time. Psychiatric:         Behavior: Behavior normal.         Judgment: Judgment normal.       ASSESSMENT:    1. Primary hypertension    2. Vitamin D deficiency    3.  Hypothyroidism due to

## 2023-04-28 RX ORDER — PREDNISONE 10 MG/1
TABLET ORAL
Qty: 20 TABLET | Refills: 0 | Status: SHIPPED | OUTPATIENT
Start: 2023-04-28

## 2023-04-28 RX ORDER — AZITHROMYCIN 250 MG/1
250 TABLET, FILM COATED ORAL SEE ADMIN INSTRUCTIONS
Qty: 6 TABLET | Refills: 0 | Status: SHIPPED | OUTPATIENT
Start: 2023-04-28 | End: 2023-05-03

## 2023-05-26 ENCOUNTER — OFFICE VISIT (OUTPATIENT)
Dept: INTERNAL MEDICINE CLINIC | Age: 58
End: 2023-05-26
Payer: COMMERCIAL

## 2023-05-26 VITALS
DIASTOLIC BLOOD PRESSURE: 70 MMHG | WEIGHT: 224.3 LBS | RESPIRATION RATE: 12 BRPM | HEART RATE: 60 BPM | BODY MASS INDEX: 36.2 KG/M2 | OXYGEN SATURATION: 99 % | SYSTOLIC BLOOD PRESSURE: 120 MMHG

## 2023-05-26 DIAGNOSIS — M25.511 ACUTE PAIN OF RIGHT SHOULDER: Primary | ICD-10-CM

## 2023-05-26 PROCEDURE — 99213 OFFICE O/P EST LOW 20 MIN: CPT | Performed by: INTERNAL MEDICINE

## 2023-05-26 PROCEDURE — 3078F DIAST BP <80 MM HG: CPT | Performed by: INTERNAL MEDICINE

## 2023-05-26 PROCEDURE — 3074F SYST BP LT 130 MM HG: CPT | Performed by: INTERNAL MEDICINE

## 2023-05-26 NOTE — PROGRESS NOTES
Dereje Zamora  1965  05/26/23    SUBJECTIVE:    Pt complains of pain in the right shoulder. She has an area of swelling on the lateral shoulder which is tender. There is pain without movement, but worse with moving her arm across her body, abducting, internal rotation. Her arm feels weak, but pt unsure if this is because of pan. She has not treated this pain. OBJECTIVE:    /70   Pulse 60   Resp 12   Wt 224 lb 4.8 oz (101.7 kg)   LMP 12/04/2017   SpO2 99%   BMI 36.20 kg/m²     Physical Exam  Focal area of swelling, warmth, and mild tenderness right lateral shoulder    (+) impingement sign. (+) Apley scratch test. No pain with palpation of biceps tendon or AC joint. (-) apprehension test. (+) decreased strength with abduction. ASSESSMENT:    1. Acute pain of right shoulder        PLAN:    Dot Bravo was seen today for shoulder pain and arm pain. Diagnoses and all orders for this visit:    Acute pain of right shoulder - I am worried pt may have a tear of the rotator cuff, and the mass on the lateral shoulder is the distal tendon.  I will refer to Dr Saumya Alexander for eval  -     External Referral To Orthopedic Surgery

## 2023-09-13 ENCOUNTER — OFFICE VISIT (OUTPATIENT)
Dept: OBGYN | Age: 58
End: 2023-09-13
Payer: COMMERCIAL

## 2023-09-13 VITALS
SYSTOLIC BLOOD PRESSURE: 150 MMHG | DIASTOLIC BLOOD PRESSURE: 92 MMHG | HEIGHT: 66 IN | BODY MASS INDEX: 35.68 KG/M2 | WEIGHT: 222 LBS

## 2023-09-13 DIAGNOSIS — N89.8 VAGINAL DRYNESS: ICD-10-CM

## 2023-09-13 DIAGNOSIS — N95.1 MENOPAUSAL SYMPTOM: ICD-10-CM

## 2023-09-13 DIAGNOSIS — N94.10 DYSPAREUNIA, FEMALE: Primary | ICD-10-CM

## 2023-09-13 DIAGNOSIS — E66.01 SEVERE OBESITY (BMI 35.0-39.9) WITH COMORBIDITY (HCC): ICD-10-CM

## 2023-09-13 PROCEDURE — 3077F SYST BP >= 140 MM HG: CPT | Performed by: OBSTETRICS & GYNECOLOGY

## 2023-09-13 PROCEDURE — 99214 OFFICE O/P EST MOD 30 MIN: CPT | Performed by: OBSTETRICS & GYNECOLOGY

## 2023-09-13 PROCEDURE — 3080F DIAST BP >= 90 MM HG: CPT | Performed by: OBSTETRICS & GYNECOLOGY

## 2023-09-13 RX ORDER — ESTRADIOL 2 MG/1
2 TABLET ORAL DAILY
Qty: 30 TABLET | Refills: 11 | Status: SHIPPED | OUTPATIENT
Start: 2023-09-13

## 2023-09-13 RX ORDER — ESTRADIOL 0.1 MG/G
1 CREAM VAGINAL DAILY
Qty: 1 EACH | Refills: 3 | Status: SHIPPED | OUTPATIENT
Start: 2023-09-13

## 2023-10-03 DIAGNOSIS — N94.10 DYSPAREUNIA, FEMALE: ICD-10-CM

## 2023-10-03 RX ORDER — ESTRADIOL 0.1 MG/G
1 CREAM VAGINAL DAILY
Qty: 1 EACH | Refills: 3 | Status: SHIPPED | OUTPATIENT
Start: 2023-10-03

## 2023-10-03 NOTE — TELEPHONE ENCOUNTER
[facility] called requesting a 90 day RX of the Estradiol 0.1 MG cream. Pt prefers mail order. Please advise.

## 2024-01-02 DIAGNOSIS — I10 ESSENTIAL HYPERTENSION: ICD-10-CM

## 2024-01-02 DIAGNOSIS — E03.4 HYPOTHYROIDISM DUE TO ACQUIRED ATROPHY OF THYROID: ICD-10-CM

## 2024-01-02 RX ORDER — LISINOPRIL 20 MG/1
TABLET ORAL
Qty: 90 TABLET | Refills: 1 | Status: SHIPPED | OUTPATIENT
Start: 2024-01-02

## 2024-01-02 RX ORDER — LEVOTHYROXINE SODIUM 175 UG/1
TABLET ORAL
Qty: 90 TABLET | Refills: 1 | Status: SHIPPED | OUTPATIENT
Start: 2024-01-02

## 2024-01-08 SDOH — ECONOMIC STABILITY: FOOD INSECURITY: WITHIN THE PAST 12 MONTHS, YOU WORRIED THAT YOUR FOOD WOULD RUN OUT BEFORE YOU GOT MONEY TO BUY MORE.: NEVER TRUE

## 2024-01-08 SDOH — ECONOMIC STABILITY: FOOD INSECURITY: WITHIN THE PAST 12 MONTHS, THE FOOD YOU BOUGHT JUST DIDN'T LAST AND YOU DIDN'T HAVE MONEY TO GET MORE.: NEVER TRUE

## 2024-01-08 SDOH — ECONOMIC STABILITY: INCOME INSECURITY: HOW HARD IS IT FOR YOU TO PAY FOR THE VERY BASICS LIKE FOOD, HOUSING, MEDICAL CARE, AND HEATING?: NOT HARD AT ALL

## 2024-01-10 ENCOUNTER — OFFICE VISIT (OUTPATIENT)
Dept: OBGYN | Age: 59
End: 2024-01-10

## 2024-01-10 ENCOUNTER — HOSPITAL ENCOUNTER (OUTPATIENT)
Age: 59
Setting detail: SPECIMEN
Discharge: HOME | End: 2024-01-10

## 2024-01-10 VITALS
HEART RATE: 65 BPM | DIASTOLIC BLOOD PRESSURE: 82 MMHG | SYSTOLIC BLOOD PRESSURE: 137 MMHG | WEIGHT: 222 LBS | HEIGHT: 66 IN | BODY MASS INDEX: 35.68 KG/M2

## 2024-01-10 DIAGNOSIS — Z13.820 ENCOUNTER FOR SCREENING FOR OSTEOPOROSIS: ICD-10-CM

## 2024-01-10 DIAGNOSIS — Z12.31 SCREENING MAMMOGRAM FOR BREAST CANCER: ICD-10-CM

## 2024-01-10 DIAGNOSIS — N90.89 VULVAR LESION: ICD-10-CM

## 2024-01-10 DIAGNOSIS — Z01.419 WELL WOMAN EXAM WITH ROUTINE GYNECOLOGICAL EXAM: Primary | ICD-10-CM

## 2024-01-10 DIAGNOSIS — N94.10 DYSPAREUNIA, FEMALE: ICD-10-CM

## 2024-01-10 RX ORDER — ESTRADIOL 0.1 MG/G
1 CREAM VAGINAL DAILY
Qty: 1 EACH | Refills: 3 | Status: SHIPPED | OUTPATIENT
Start: 2024-01-10

## 2024-01-10 RX ORDER — ESTRADIOL 2 MG/1
1 TABLET ORAL DAILY
Qty: 30 TABLET | Refills: 11 | Status: SHIPPED | OUTPATIENT
Start: 2024-01-10

## 2024-01-10 ASSESSMENT — PATIENT HEALTH QUESTIONNAIRE - PHQ9
1. LITTLE INTEREST OR PLEASURE IN DOING THINGS: 1
SUM OF ALL RESPONSES TO PHQ QUESTIONS 1-9: 6
9. THOUGHTS THAT YOU WOULD BE BETTER OFF DEAD, OR OF HURTING YOURSELF: 0
SUM OF ALL RESPONSES TO PHQ QUESTIONS 1-9: 6
10. IF YOU CHECKED OFF ANY PROBLEMS, HOW DIFFICULT HAVE THESE PROBLEMS MADE IT FOR YOU TO DO YOUR WORK, TAKE CARE OF THINGS AT HOME, OR GET ALONG WITH OTHER PEOPLE: 0
4. FEELING TIRED OR HAVING LITTLE ENERGY: 3
6. FEELING BAD ABOUT YOURSELF - OR THAT YOU ARE A FAILURE OR HAVE LET YOURSELF OR YOUR FAMILY DOWN: 0
7. TROUBLE CONCENTRATING ON THINGS, SUCH AS READING THE NEWSPAPER OR WATCHING TELEVISION: 2
8. MOVING OR SPEAKING SO SLOWLY THAT OTHER PEOPLE COULD HAVE NOTICED. OR THE OPPOSITE, BEING SO FIGETY OR RESTLESS THAT YOU HAVE BEEN MOVING AROUND A LOT MORE THAN USUAL: 0
3. TROUBLE FALLING OR STAYING ASLEEP: 0
SUM OF ALL RESPONSES TO PHQ QUESTIONS 1-9: 6
5. POOR APPETITE OR OVEREATING: 0
SUM OF ALL RESPONSES TO PHQ QUESTIONS 1-9: 6

## 2024-01-11 NOTE — PROGRESS NOTES
1/11/24    Mirtha Mar  1965    Chief Complaint   Patient presents with    Gynecologic Exam     Pt here for annual, is sexually active, postmenopausal, had hyster/bso, hrt-estradiol, pap-2019-neg, mammo-7/14/2023-neg, dexa--1/28/22-neg, [id number].     Follow-up     Pt here for 4 month f/u on estradiol. Pt states she continues to have dyspareunia and vaginal dryness.         Mirtha Mar is a 58 y.o. female who presents today for evaluation of see above.    Past Medical History:   Diagnosis Date    Depression     Dyspareunia in female     Hypertension     \"on medication for 8 yrs \"    Hypothyroidism     Irregular periods 12/2017    \"since April 2017- lots of clotting- heavy flow and cramping\"    Thyroid disease     Vaginal dryness        Past Surgical History:   Procedure Laterality Date    APPENDECTOMY  2009    CHOLECYSTECTOMY  12/01/2013    COLONOSCOPY  06/14/2017    Diverticulosis, 1 polyp    FOOT SURGERY Right 04/2019    bone spur    HYSTERECTOMY (CERVIX STATUS UNKNOWN)      HYSTEROSCOPY  12/19/2017    D & C    OTHER SURGICAL HISTORY      IUD inserted 12/2013    SHOULDER SURGERY Right 2010    KELLY AND BSO (CERVIX REMOVED)  02/2017       Social History     Tobacco Use    Smoking status: Never    Smokeless tobacco: Never   Vaping Use    Vaping Use: Never used   Substance Use Topics    Alcohol use: No     Comment: \"quit drinking 8 yrs ago- use to drink 3-4 times per week- average 3 beers\"    Drug use: No       Family History   Problem Relation Age of Onset    Hypertension Mother     Migraines Mother     Thyroid Disease Mother     Depression Mother     Asthma Mother     Migraines Sister     Hypertension Father     Thyroid Disease Father     Other Father         polycythemia    Thyroid Disease Brother        Current Outpatient Medications   Medication Sig Dispense Refill    estradiol (ESTRACE VAGINAL) 0.1 MG/GM vaginal cream Place 1 g vaginally daily 1 each 3    estradiol (ESTRACE) 2 MG tablet Take

## 2024-01-17 ENCOUNTER — OFFICE VISIT (OUTPATIENT)
Dept: OBGYN | Age: 59
End: 2024-01-17
Payer: COMMERCIAL

## 2024-01-17 VITALS — BODY MASS INDEX: 35.67 KG/M2 | WEIGHT: 221 LBS | SYSTOLIC BLOOD PRESSURE: 134 MMHG | DIASTOLIC BLOOD PRESSURE: 84 MMHG

## 2024-01-17 DIAGNOSIS — N90.4 LICHEN SCLEROSUS OF VULVA: ICD-10-CM

## 2024-01-17 DIAGNOSIS — N94.10 DYSPAREUNIA, FEMALE: Primary | ICD-10-CM

## 2024-01-17 DIAGNOSIS — N89.8 VAGINAL DRYNESS: ICD-10-CM

## 2024-01-17 PROCEDURE — 3079F DIAST BP 80-89 MM HG: CPT | Performed by: OBSTETRICS & GYNECOLOGY

## 2024-01-17 PROCEDURE — 99214 OFFICE O/P EST MOD 30 MIN: CPT | Performed by: OBSTETRICS & GYNECOLOGY

## 2024-01-17 PROCEDURE — 3075F SYST BP GE 130 - 139MM HG: CPT | Performed by: OBSTETRICS & GYNECOLOGY

## 2024-01-17 RX ORDER — CLOBETASOL PROPIONATE 0.5 MG/G
OINTMENT TOPICAL
Qty: 1 EACH | Refills: 2 | Status: SHIPPED | OUTPATIENT
Start: 2024-01-17 | End: 2024-01-17

## 2024-01-17 RX ORDER — CLOBETASOL PROPIONATE 0.5 MG/G
OINTMENT TOPICAL
Qty: 1 EACH | Refills: 2 | Status: SHIPPED | OUTPATIENT
Start: 2024-01-17

## 2024-01-17 NOTE — PROGRESS NOTES
1/17/24    Mirtha Mar  1965    Chief Complaint   Patient presents with    Follow-up     Pt here for pathology results, and follow up for dyspareunia and vaginal dryness. Pt states she is using estradiol cream and oral tablet, pt states she has not noticed a difference thus far. No new c/o.         Mirtha Mar is a 58 y.o. female who presents today for evaluation of see above.    Past Medical History:   Diagnosis Date    Depression     Dyspareunia in female     Hypertension     \"on medication for 8 yrs \"    Hypothyroidism     Irregular periods 12/2017    \"since April 2017- lots of clotting- heavy flow and cramping\"    Thyroid disease     Vaginal dryness        Past Surgical History:   Procedure Laterality Date    APPENDECTOMY  2009    CHOLECYSTECTOMY  12/01/2013    COLONOSCOPY  06/14/2017    Diverticulosis, 1 polyp    FOOT SURGERY Right 04/2019    bone spur    HYSTERECTOMY (CERVIX STATUS UNKNOWN)      HYSTEROSCOPY  12/19/2017    D & C    OTHER SURGICAL HISTORY      IUD inserted 12/2013    SHOULDER SURGERY Right 2010    KELLY AND BSO (CERVIX REMOVED)  02/2017       Social History     Tobacco Use    Smoking status: Never    Smokeless tobacco: Never   Vaping Use    Vaping Use: Never used   Substance Use Topics    Alcohol use: No     Comment: \"quit drinking 8 yrs ago- use to drink 3-4 times per week- average 3 beers\"    Drug use: No       Family History   Problem Relation Age of Onset    Hypertension Mother     Migraines Mother     Thyroid Disease Mother     Depression Mother     Asthma Mother     Migraines Sister     Hypertension Father     Thyroid Disease Father     Other Father         polycythemia    Thyroid Disease Brother        Current Outpatient Medications   Medication Sig Dispense Refill    clobetasol (TEMOVATE) 0.05 % ointment Apply topically 2 times daily. 1 each 2    estradiol (ESTRACE VAGINAL) 0.1 MG/GM vaginal cream Place 1 g vaginally daily 1 each 3    estradiol (ESTRACE) 2 MG tablet Take 0.5

## 2024-01-23 ENCOUNTER — TELEPHONE (OUTPATIENT)
Dept: OBGYN | Age: 59
End: 2024-01-23

## 2024-01-23 DIAGNOSIS — N94.10 DYSPAREUNIA, FEMALE: Primary | ICD-10-CM

## 2024-01-23 NOTE — TELEPHONE ENCOUNTER
Pharmacy called in regards to the patient's Estradiol tablets. They need clarification on quantity and directions. Please advise.     Reference number for call back is #[id number]

## 2024-01-25 RX ORDER — ESTRADIOL 2 MG/1
2 TABLET ORAL DAILY
Qty: 90 TABLET | Refills: 3 | Status: SHIPPED | OUTPATIENT
Start: 2024-01-25

## 2024-01-25 NOTE — TELEPHONE ENCOUNTER
Pharmacy states that the medication requested is no longer valid and needs resent. Pt needs the Estradiol tablets w/ updated order-I tablet daily.

## 2024-01-30 ENCOUNTER — HOSPITAL ENCOUNTER (OUTPATIENT)
Dept: WOMENS IMAGING | Age: 59
Discharge: HOME | End: 2024-01-30
Attending: OBSTETRICS & GYNECOLOGY
Payer: COMMERCIAL

## 2024-01-30 DIAGNOSIS — Z13.820 ENCOUNTER FOR SCREENING FOR OSTEOPOROSIS: ICD-10-CM

## 2024-01-30 PROCEDURE — 77080 DXA BONE DENSITY AXIAL: CPT

## 2024-03-04 ENCOUNTER — OFFICE VISIT (OUTPATIENT)
Dept: INTERNAL MEDICINE CLINIC | Age: 59
End: 2024-03-04
Payer: COMMERCIAL

## 2024-03-04 VITALS
BODY MASS INDEX: 35.64 KG/M2 | OXYGEN SATURATION: 97 % | SYSTOLIC BLOOD PRESSURE: 132 MMHG | WEIGHT: 220.8 LBS | HEART RATE: 75 BPM | DIASTOLIC BLOOD PRESSURE: 82 MMHG

## 2024-03-04 DIAGNOSIS — J01.00 ACUTE NON-RECURRENT MAXILLARY SINUSITIS: Primary | ICD-10-CM

## 2024-03-04 PROCEDURE — 3079F DIAST BP 80-89 MM HG: CPT | Performed by: INTERNAL MEDICINE

## 2024-03-04 PROCEDURE — 99213 OFFICE O/P EST LOW 20 MIN: CPT | Performed by: INTERNAL MEDICINE

## 2024-03-04 PROCEDURE — 3075F SYST BP GE 130 - 139MM HG: CPT | Performed by: INTERNAL MEDICINE

## 2024-03-04 RX ORDER — AMOXICILLIN 500 MG/1
500 CAPSULE ORAL 3 TIMES DAILY
Qty: 30 CAPSULE | Refills: 0 | Status: SHIPPED | OUTPATIENT
Start: 2024-03-04 | End: 2024-03-14

## 2024-03-04 RX ORDER — BENZONATATE 200 MG/1
200 CAPSULE ORAL 3 TIMES DAILY PRN
Qty: 30 CAPSULE | Refills: 0 | Status: SHIPPED | OUTPATIENT
Start: 2024-03-04 | End: 2024-03-11

## 2024-03-04 NOTE — PROGRESS NOTES
Mirtha Mar  1965  03/04/24    SUBJECTIVE:    Pt with sore throat, cough productive of green and yellow mucous, sinus pressure, PND, ear pain, arthralgias, SOB, nasal congestion, chills, low grade fever, sweats.    She denies wheezing.     Symptoms started Saturday. She tested (-) for covid. She has used dayquil, nyquil, cough med.     OBJECTIVE:    /82 (Site: Left Upper Arm, Position: Sitting, Cuff Size: Large Adult)   Pulse 75   Wt 100.2 kg (220 lb 12.8 oz)   LMP 12/04/2017   SpO2 97%   BMI 35.64 kg/m²     Physical Exam  Constitutional:       Appearance: She is well-developed.   HENT:      Right Ear: External ear normal.      Left Ear: External ear normal.      Nose: Nose normal.      Mouth/Throat:      Pharynx: No oropharyngeal exudate.   Eyes:      Conjunctiva/sclera: Conjunctivae normal.   Cardiovascular:      Rate and Rhythm: Normal rate and regular rhythm.      Heart sounds: Normal heart sounds.   Pulmonary:      Effort: Pulmonary effort is normal.      Breath sounds: Normal breath sounds.   Lymphadenopathy:      Cervical: No cervical adenopathy.   Neurological:      Mental Status: She is alert.         ASSESSMENT:    1. Acute non-recurrent maxillary sinusitis        PLAN:    Mirtha was seen today for sinusitis.    Diagnoses and all orders for this visit:    Acute non-recurrent maxillary sinusitis - likely sinusitis; Tx with sinus rinse, tessalon, amox, zyrtec, flonase, and sudafed  -     benzonatate (TESSALON) 200 MG capsule; Take 1 capsule by mouth 3 times daily as needed for Cough  -     amoxicillin (AMOXIL) 500 MG capsule; Take 1 capsule by mouth 3 times daily for 10 days

## 2024-03-20 ENCOUNTER — OFFICE VISIT (OUTPATIENT)
Dept: OBGYN | Age: 59
End: 2024-03-20
Payer: COMMERCIAL

## 2024-03-20 VITALS
SYSTOLIC BLOOD PRESSURE: 143 MMHG | BODY MASS INDEX: 35.84 KG/M2 | DIASTOLIC BLOOD PRESSURE: 87 MMHG | HEIGHT: 66 IN | WEIGHT: 223 LBS

## 2024-03-20 DIAGNOSIS — E66.01 SEVERE OBESITY (BMI 35.0-39.9) WITH COMORBIDITY (HCC): ICD-10-CM

## 2024-03-20 DIAGNOSIS — N90.4 LICHEN SCLEROSUS OF VULVA: Primary | ICD-10-CM

## 2024-03-20 PROCEDURE — 3077F SYST BP >= 140 MM HG: CPT | Performed by: OBSTETRICS & GYNECOLOGY

## 2024-03-20 PROCEDURE — 3079F DIAST BP 80-89 MM HG: CPT | Performed by: OBSTETRICS & GYNECOLOGY

## 2024-03-20 PROCEDURE — 99213 OFFICE O/P EST LOW 20 MIN: CPT | Performed by: OBSTETRICS & GYNECOLOGY

## 2024-03-20 NOTE — PROGRESS NOTES
TAKE 1 TABLET DAILY 90 tablet 1    levothyroxine (SYNTHROID) 175 MCG tablet TAKE 1 TABLET DAILY 90 tablet 1    escitalopram (LEXAPRO) 5 MG tablet Take 1 tablet by mouth daily 90 tablet 3    buPROPion (WELLBUTRIN XL) 150 MG extended release tablet Take 1 tablet by mouth every morning 90 tablet 3    fluticasone (FLONASE) 50 MCG/ACT nasal spray SHAKE LIQUID AND USE 2 SPRAYS IN EACH NOSTRIL DAILY 16 g 5    fluocinonide (LIDEX) 0.05 % cream Apply topically 2 times daily. 30 g 1    diclofenac sodium 1 % GEL Apply 2 g topically 4 times daily Apply to affect heal on area on the right heal four times daily 1 Tube 5    loratadine (CLARITIN) 10 MG tablet Take 1 tablet by mouth daily      multivitamin (THERAGRAN) per tablet Take 1 tablet by mouth daily       No current facility-administered medications for this visit.       Allergies   Allergen Reactions    Doxycycline      vertigo           Immunization History   Administered Date(s) Administered    TDaP, ADACEL (age 10y-64y), BOOSTRIX (age 10y+), IM, 0.5mL 2019    Zoster Recombinant (Shingrix) 2022, 2022       Review of Systems  All other systems reviewed and are negative    BP (!) 143/87   Ht 1.676 m (5' 6\")   Wt 101.2 kg (223 lb)   LMP 2017   BMI 35.99 kg/m²     Physical Exam    No results found for this visit on 24.    ASSESSMENT AND PLAN   Diagnosis Orders   1. Lichen sclerosis of vulva        2. Severe obesity (BMI 35.0-39.9) with comorbidity (HCC)        Patient reports symptoms are significantly improved as compared to before treatment.  She was instructed to use the clobetasol as needed with flareups and the estrogen cream as prescribed.    No changes to Lichen sclerosis of vulva, continue using vaginal cream.    Chaperone Bernice Bassett was present for the entire appointment.       Return for Annual.    Adarsh Mendoza MD

## 2024-03-25 ENCOUNTER — OFFICE VISIT (OUTPATIENT)
Dept: INTERNAL MEDICINE CLINIC | Age: 59
End: 2024-03-25
Payer: COMMERCIAL

## 2024-03-25 VITALS
DIASTOLIC BLOOD PRESSURE: 78 MMHG | SYSTOLIC BLOOD PRESSURE: 122 MMHG | OXYGEN SATURATION: 98 % | HEART RATE: 65 BPM | BODY MASS INDEX: 35.36 KG/M2 | WEIGHT: 220 LBS | HEIGHT: 66 IN

## 2024-03-25 DIAGNOSIS — J40 BRONCHITIS: Primary | ICD-10-CM

## 2024-03-25 PROBLEM — F33.0 MAJOR DEPRESSIVE DISORDER, RECURRENT, MILD (HCC): Status: ACTIVE | Noted: 2024-03-25

## 2024-03-25 PROBLEM — F33.9 MAJOR DEPRESSIVE DISORDER, RECURRENT, UNSPECIFIED (HCC): Status: ACTIVE | Noted: 2024-03-25

## 2024-03-25 PROBLEM — F33.1 MAJOR DEPRESSIVE DISORDER, RECURRENT, MODERATE (HCC): Status: ACTIVE | Noted: 2024-03-25

## 2024-03-25 PROCEDURE — 3074F SYST BP LT 130 MM HG: CPT | Performed by: INTERNAL MEDICINE

## 2024-03-25 PROCEDURE — 99213 OFFICE O/P EST LOW 20 MIN: CPT | Performed by: INTERNAL MEDICINE

## 2024-03-25 PROCEDURE — 3078F DIAST BP <80 MM HG: CPT | Performed by: INTERNAL MEDICINE

## 2024-03-25 RX ORDER — AZITHROMYCIN 250 MG/1
TABLET, FILM COATED ORAL
Qty: 6 TABLET | Refills: 0 | Status: SHIPPED | OUTPATIENT
Start: 2024-03-25 | End: 2024-04-04

## 2024-03-25 RX ORDER — PREDNISONE 10 MG/1
TABLET ORAL
Qty: 20 TABLET | Refills: 0 | Status: SHIPPED | OUTPATIENT
Start: 2024-03-25

## 2024-03-25 RX ORDER — CODEINE PHOSPHATE/GUAIFENESIN 10-100MG/5
5 LIQUID (ML) ORAL 4 TIMES DAILY PRN
Qty: 120 ML | Refills: 0 | Status: SHIPPED | OUTPATIENT
Start: 2024-03-25 | End: 2024-04-01

## 2024-03-25 NOTE — PROGRESS NOTES
Mirtha Mar  1965  03/25/24    SUBJECTIVE:    Pt felt she was improving from her recent illness, but Friday she woke with sweats, low grade fever, non-productive cough, rhinorrhea, with clear mucous, SOB, wheezing, frontal HA, mild sinus pain.    She denies ear pains, myalgias.    OBJECTIVE:    /78   Pulse 65   Ht 1.676 m (5' 5.98\")   Wt 99.8 kg (220 lb)   LMP 12/04/2017   SpO2 98%   BMI 35.53 kg/m²     Physical Exam  Constitutional:       Appearance: She is well-developed.   HENT:      Right Ear: External ear normal.      Left Ear: External ear normal.      Nose: Nose normal.      Mouth/Throat:      Pharynx: No oropharyngeal exudate.   Eyes:      Conjunctiva/sclera: Conjunctivae normal.   Cardiovascular:      Rate and Rhythm: Normal rate and regular rhythm.      Heart sounds: Normal heart sounds.   Pulmonary:      Effort: Pulmonary effort is normal.      Breath sounds: Normal breath sounds.   Lymphadenopathy:      Cervical: No cervical adenopathy.   Neurological:      Mental Status: She is alert.         ASSESSMENT:    1. Bronchitis        PLAN:    Mirtha was seen today for cough, congestion, fever and other.    Diagnoses and all orders for this visit:    Bronchitis - I think pt has developed a new infx. Tx with prednisone, azith, cough med. No need for imaging  -     predniSONE (DELTASONE) 10 MG tablet; Take 4 tablets daily for 2 days, then 3 tablets daily for 2 days, then two tablets daily for 2 days, then one tablet daily for 2 days  -     azithromycin (ZITHROMAX) 250 MG tablet; 500mg on day 1 followed by 250mg on days 2 - 5  -     guaiFENesin-codeine (TUSSI-ORGANIDIN NR) 100-10 MG/5ML syrup; Take 5 mLs by mouth 4 times daily as needed for Cough for up to 7 days. Max Daily Amount: 20 mLs

## 2024-03-28 ENCOUNTER — HOSPITAL ENCOUNTER (OUTPATIENT)
Dept: GENERAL RADIOLOGY | Age: 59
Discharge: HOME | End: 2024-03-28
Payer: COMMERCIAL

## 2024-03-28 ENCOUNTER — HOSPITAL ENCOUNTER (OUTPATIENT)
Age: 59
Discharge: HOME | End: 2024-03-28
Payer: COMMERCIAL

## 2024-03-28 DIAGNOSIS — J40 BRONCHITIS: Primary | ICD-10-CM

## 2024-03-28 DIAGNOSIS — J40 BRONCHITIS: ICD-10-CM

## 2024-03-28 PROCEDURE — 71046 X-RAY EXAM CHEST 2 VIEWS: CPT

## 2024-03-28 RX ORDER — BENZONATATE 200 MG/1
200 CAPSULE ORAL 3 TIMES DAILY PRN
Qty: 30 CAPSULE | Refills: 0 | Status: SHIPPED | OUTPATIENT
Start: 2024-03-28 | End: 2024-04-07

## 2024-04-01 DIAGNOSIS — F33.41 RECURRENT MAJOR DEPRESSIVE DISORDER, IN PARTIAL REMISSION (HCC): ICD-10-CM

## 2024-04-01 RX ORDER — ESCITALOPRAM OXALATE 5 MG/1
5 TABLET ORAL DAILY
Qty: 90 TABLET | Refills: 3 | Status: SHIPPED | OUTPATIENT
Start: 2024-04-01

## 2024-04-17 DIAGNOSIS — R49.1 LOSS OF VOICE: ICD-10-CM

## 2024-04-17 DIAGNOSIS — R05.9 COUGH, UNSPECIFIED TYPE: Primary | ICD-10-CM

## 2024-06-14 ENCOUNTER — OFFICE VISIT (OUTPATIENT)
Dept: INTERNAL MEDICINE CLINIC | Age: 59
End: 2024-06-14
Payer: COMMERCIAL

## 2024-06-14 VITALS
RESPIRATION RATE: 18 BRPM | WEIGHT: 220 LBS | HEART RATE: 75 BPM | OXYGEN SATURATION: 98 % | BODY MASS INDEX: 35.53 KG/M2 | SYSTOLIC BLOOD PRESSURE: 126 MMHG | DIASTOLIC BLOOD PRESSURE: 86 MMHG

## 2024-06-14 DIAGNOSIS — J40 BRONCHITIS: Primary | ICD-10-CM

## 2024-06-14 PROCEDURE — 99213 OFFICE O/P EST LOW 20 MIN: CPT | Performed by: NURSE PRACTITIONER

## 2024-06-14 PROCEDURE — 3079F DIAST BP 80-89 MM HG: CPT | Performed by: NURSE PRACTITIONER

## 2024-06-14 PROCEDURE — 3074F SYST BP LT 130 MM HG: CPT | Performed by: NURSE PRACTITIONER

## 2024-06-14 RX ORDER — PREDNISONE 10 MG/1
TABLET ORAL
Qty: 20 TABLET | Refills: 0 | Status: SHIPPED | OUTPATIENT
Start: 2024-06-14

## 2024-06-14 RX ORDER — BENZONATATE 200 MG/1
200 CAPSULE ORAL 3 TIMES DAILY PRN
Qty: 30 CAPSULE | Refills: 0 | Status: SHIPPED | OUTPATIENT
Start: 2024-06-14 | End: 2024-06-21

## 2024-06-14 ASSESSMENT — ENCOUNTER SYMPTOMS
ABDOMINAL PAIN: 0
SINUS PRESSURE: 0
RHINORRHEA: 1
SHORTNESS OF BREATH: 0
VOMITING: 0
SINUS PAIN: 0
COUGH: 1
DIARRHEA: 0
SORE THROAT: 1
COLOR CHANGE: 0
APNEA: 0
CHEST TIGHTNESS: 0
NAUSEA: 0

## 2024-06-14 NOTE — PROGRESS NOTES
Mirtha Mar  1965  06/14/24    Chief Complaint   Patient presents with    Pharyngitis    Generalized Body Aches    Congestion    Cough    Other     nightsweats       SUBJECTIVE:      Mrs Mar is a current patient of Dr Steven who presents for c/o worsening generalized body aches, nasal congestion, hoarseness of voices, PND, mild pharyngitis starting yesterday. Her  has also been ill with similar symptoms. Has been using zyrtec and flonase daily with no improvement. Cough is congested, but not productive.     Review of Systems   Constitutional:  Positive for chills. Negative for activity change, appetite change, fatigue and fever.   HENT:  Positive for congestion, postnasal drip, rhinorrhea and sore throat. Negative for nosebleeds, sinus pressure and sinus pain.    Respiratory:  Positive for cough. Negative for apnea, chest tightness and shortness of breath.    Cardiovascular:  Negative for chest pain and palpitations.   Gastrointestinal:  Negative for abdominal pain, diarrhea, nausea and vomiting.   Genitourinary:  Negative for difficulty urinating, flank pain and hematuria.   Musculoskeletal:  Positive for myalgias. Negative for arthralgias and joint swelling.   Skin:  Negative for color change and rash.   Neurological:  Negative for dizziness, light-headedness and headaches.   Psychiatric/Behavioral: Negative.  Negative for behavioral problems.        OBJECTIVE:    /86   Pulse 75   Resp 18   Wt 99.8 kg (220 lb)   LMP 12/04/2017   SpO2 98%   BMI 35.53 kg/m²     Physical Exam  Constitutional:       Appearance: She is well-developed.   HENT:      Right Ear: External ear normal.      Left Ear: External ear normal.      Nose: Mucosal edema and rhinorrhea present.   Cardiovascular:      Rate and Rhythm: Normal rate and regular rhythm.   Pulmonary:      Effort: Pulmonary effort is normal. No respiratory distress.      Breath sounds: Normal breath sounds. No wheezing.   Musculoskeletal:

## 2024-06-24 RX ORDER — BUPROPION HYDROCHLORIDE 150 MG/1
150 TABLET ORAL EVERY MORNING
Qty: 90 TABLET | Refills: 3 | Status: SHIPPED | OUTPATIENT
Start: 2024-06-24

## 2024-07-01 DIAGNOSIS — E03.4 HYPOTHYROIDISM DUE TO ACQUIRED ATROPHY OF THYROID: ICD-10-CM

## 2024-07-01 DIAGNOSIS — I10 ESSENTIAL HYPERTENSION: ICD-10-CM

## 2024-07-01 RX ORDER — LEVOTHYROXINE SODIUM 175 UG/1
TABLET ORAL
Qty: 90 TABLET | Refills: 3 | Status: SHIPPED | OUTPATIENT
Start: 2024-07-01

## 2024-07-01 RX ORDER — LISINOPRIL 20 MG/1
TABLET ORAL
Qty: 90 TABLET | Refills: 3 | Status: SHIPPED | OUTPATIENT
Start: 2024-07-01

## 2024-11-26 DIAGNOSIS — N94.10 DYSPAREUNIA, FEMALE: ICD-10-CM

## 2024-11-26 RX ORDER — ESTRADIOL 0.1 MG/G
CREAM VAGINAL
Qty: 42.5 G | Refills: 1 | OUTPATIENT
Start: 2024-11-26

## 2025-02-17 DIAGNOSIS — N94.10 DYSPAREUNIA, FEMALE: ICD-10-CM

## 2025-02-17 RX ORDER — ESTRADIOL 2 MG/1
2 TABLET ORAL DAILY
Qty: 90 TABLET | Refills: 3 | OUTPATIENT
Start: 2025-02-17

## 2025-03-18 ENCOUNTER — COMMUNITY OUTREACH (OUTPATIENT)
Dept: INTERNAL MEDICINE CLINIC | Age: 60
End: 2025-03-18

## 2025-03-27 DIAGNOSIS — F33.41 RECURRENT MAJOR DEPRESSIVE DISORDER, IN PARTIAL REMISSION: ICD-10-CM

## 2025-03-27 RX ORDER — ESCITALOPRAM OXALATE 5 MG/1
5 TABLET ORAL DAILY
Qty: 90 TABLET | Refills: 0 | Status: SHIPPED | OUTPATIENT
Start: 2025-03-27

## 2025-05-10 DIAGNOSIS — J01.00 ACUTE NON-RECURRENT MAXILLARY SINUSITIS: ICD-10-CM

## 2025-05-12 DIAGNOSIS — J01.00 ACUTE NON-RECURRENT MAXILLARY SINUSITIS: ICD-10-CM

## 2025-05-12 RX ORDER — FLUTICASONE PROPIONATE 50 MCG
2 SPRAY, SUSPENSION (ML) NASAL DAILY
Qty: 16 G | Refills: 5 | Status: SHIPPED | OUTPATIENT
Start: 2025-05-12 | End: 2025-05-12

## 2025-05-12 RX ORDER — FLUTICASONE PROPIONATE 50 MCG
SPRAY, SUSPENSION (ML) NASAL
Qty: 48 G | Refills: 3 | Status: SHIPPED | OUTPATIENT
Start: 2025-05-12

## 2025-06-19 RX ORDER — BUPROPION HYDROCHLORIDE 150 MG/1
150 TABLET ORAL EVERY MORNING
Qty: 90 TABLET | Refills: 3 | OUTPATIENT
Start: 2025-06-19

## 2025-06-25 DIAGNOSIS — I10 ESSENTIAL HYPERTENSION: ICD-10-CM

## 2025-06-25 DIAGNOSIS — E03.4 HYPOTHYROIDISM DUE TO ACQUIRED ATROPHY OF THYROID: ICD-10-CM

## 2025-06-25 DIAGNOSIS — F33.41 RECURRENT MAJOR DEPRESSIVE DISORDER, IN PARTIAL REMISSION: ICD-10-CM

## 2025-06-25 RX ORDER — LEVOTHYROXINE SODIUM 175 UG/1
175 TABLET ORAL DAILY
Qty: 90 TABLET | Refills: 0 | Status: SHIPPED | OUTPATIENT
Start: 2025-06-25

## 2025-06-25 RX ORDER — LISINOPRIL 20 MG/1
20 TABLET ORAL DAILY
Qty: 90 TABLET | Refills: 0 | Status: SHIPPED | OUTPATIENT
Start: 2025-06-25

## 2025-06-25 RX ORDER — ESCITALOPRAM OXALATE 5 MG/1
5 TABLET ORAL DAILY
Qty: 90 TABLET | Refills: 0 | Status: SHIPPED | OUTPATIENT
Start: 2025-06-25

## 2025-06-25 NOTE — TELEPHONE ENCOUNTER
Spoke with the patient she is out of town as soon as she returns she will schedule an appointment with you.